# Patient Record
Sex: FEMALE | Race: WHITE | NOT HISPANIC OR LATINO | Employment: FULL TIME | URBAN - METROPOLITAN AREA
[De-identification: names, ages, dates, MRNs, and addresses within clinical notes are randomized per-mention and may not be internally consistent; named-entity substitution may affect disease eponyms.]

---

## 2018-08-03 ENCOUNTER — APPOINTMENT (OUTPATIENT)
Dept: WOMENS IMAGING | Facility: HOSPITAL | Age: 43
End: 2018-08-03

## 2018-08-03 ENCOUNTER — OFFICE VISIT (OUTPATIENT)
Dept: INTERNAL MEDICINE | Facility: CLINIC | Age: 43
End: 2018-08-03

## 2018-08-03 VITALS
WEIGHT: 148 LBS | OXYGEN SATURATION: 98 % | DIASTOLIC BLOOD PRESSURE: 64 MMHG | BODY MASS INDEX: 26.22 KG/M2 | HEIGHT: 63 IN | SYSTOLIC BLOOD PRESSURE: 94 MMHG | HEART RATE: 80 BPM

## 2018-08-03 DIAGNOSIS — M25.512 ACUTE PAIN OF LEFT SHOULDER: Primary | ICD-10-CM

## 2018-08-03 PROCEDURE — 73030 X-RAY EXAM OF SHOULDER: CPT | Performed by: NURSE PRACTITIONER

## 2018-08-03 PROCEDURE — 99213 OFFICE O/P EST LOW 20 MIN: CPT | Performed by: NURSE PRACTITIONER

## 2018-08-03 PROCEDURE — 73030 X-RAY EXAM OF SHOULDER: CPT | Performed by: RADIOLOGY

## 2018-08-03 RX ORDER — MELOXICAM 15 MG/1
15 TABLET ORAL DAILY
Qty: 30 TABLET | Refills: 0 | Status: SHIPPED | OUTPATIENT
Start: 2018-08-03 | End: 2018-09-04 | Stop reason: SDUPTHER

## 2018-08-03 RX ORDER — TRAZODONE HYDROCHLORIDE 50 MG/1
50 TABLET ORAL NIGHTLY
COMMUNITY
End: 2022-09-02

## 2018-08-03 RX ORDER — ZOLPIDEM TARTRATE 10 MG/1
TABLET ORAL NIGHTLY PRN
COMMUNITY
End: 2021-10-07

## 2018-08-03 NOTE — PROGRESS NOTES
Subjective   Romana Beavers is a 43 y.o. female who presents due to left shoulder pain.    She c/o increased pain in her left shoulder with decreased range of motion over the past month, denies recent injury or trauma. She has seen her chiro and received massage therapy with minimal improvement.       Shoulder Injury    The left shoulder is affected. There was no injury mechanism. The quality of the pain is described as aching. The pain radiates to the left neck. The pain is at a severity of 8/10. The pain is severe. Pertinent negatives include no chest pain, muscle weakness, numbness or tingling. The symptoms are aggravated by movement, overhead lifting and palpation. She has tried NSAIDs for the symptoms. The treatment provided mild relief.        The following portions of the patient's history were reviewed and updated as appropriate: allergies, current medications, past social history and problem list.    Past Medical History:   Diagnosis Date   • Abdominal pain    • Acute pharyngitis    • Acute sinusitis    • Bronchitis    • Candidiasis of mouth    • Chronic rhinitis    • Cough    • Dermatitis    • Dysuria    • GERD (gastroesophageal reflux disease)    • Neuritis    • Pain of right upper arm    • Sprain, bicep          Current Outpatient Prescriptions:   •  lamoTRIgine (LaMICtal) 100 MG tablet, Take 100 mg by mouth Daily., Disp: , Rfl:   •  Omeprazole-Sodium Bicarbonate (ZEGERID)  MG capsule, Take  by mouth., Disp: , Rfl:   •  traZODone (DESYREL) 50 MG tablet, Take 50 mg by mouth Every Night., Disp: , Rfl:   •  zolpidem (AMBIEN) 10 MG tablet, Take  by mouth At Night As Needed for Sleep., Disp: , Rfl:   •  meloxicam (MOBIC) 15 MG tablet, Take 1 tablet by mouth Daily. Take with food, Disp: 30 tablet, Rfl: 0    Allergies   Allergen Reactions   • Latex        Review of Systems   Constitutional: Negative for chills, fatigue, fever and unexpected weight change.   HENT: Negative for congestion, ear pain,  "postnasal drip, sinus pressure, sore throat and trouble swallowing.    Eyes: Negative for visual disturbance.   Respiratory: Negative for cough, chest tightness and wheezing.    Cardiovascular: Negative for chest pain, palpitations and leg swelling.   Gastrointestinal: Negative for abdominal pain.   Genitourinary: Negative for dysuria, frequency and urgency.   Musculoskeletal: Positive for arthralgias, neck pain and neck stiffness. Negative for joint swelling.   Skin: Negative for color change.   Neurological: Negative for tingling, syncope, weakness, numbness and headaches.   Hematological: Does not bruise/bleed easily.       Objective   Vitals:    08/03/18 1538   BP: 94/64   Pulse: 80   SpO2: 98%   Weight: 67.1 kg (148 lb)   Height: 160 cm (63\")     Physical Exam   Constitutional: She appears well-developed and well-nourished. She is cooperative. She does not have a sickly appearance. She does not appear ill.   HENT:   Head: Normocephalic.   Right Ear: Hearing, tympanic membrane and external ear normal.   Left Ear: Hearing, tympanic membrane and external ear normal.   Nose: Nose normal. No mucosal edema, rhinorrhea, sinus tenderness or nasal deformity. Right sinus exhibits no maxillary sinus tenderness and no frontal sinus tenderness. Left sinus exhibits no maxillary sinus tenderness and no frontal sinus tenderness.   Mouth/Throat: Oropharynx is clear and moist and mucous membranes are normal. Normal dentition.   Eyes: Pupils are equal, round, and reactive to light. Conjunctivae and lids are normal. Right eye exhibits no discharge and no exudate. Left eye exhibits no discharge and no exudate.   Neck: Trachea normal. Muscular tenderness (left paraspinal muscle tightness) present. Carotid bruit is not present. No edema and normal range of motion present. No thyroid mass and no thyromegaly present.   Cardiovascular: Regular rhythm, normal heart sounds and normal pulses.    No murmur heard.  Pulmonary/Chest: Breath " sounds normal. No respiratory distress. She has no decreased breath sounds. She has no wheezes. She has no rhonchi. She has no rales.   Musculoskeletal:        Left shoulder: She exhibits decreased range of motion and tenderness. She exhibits no swelling.   Increased pain with internal and external rotation of left shoulder   Lymphadenopathy:        Head (right side): No submental, no submandibular, no tonsillar, no preauricular, no posterior auricular and no occipital adenopathy present.        Head (left side): No submental, no submandibular, no tonsillar, no preauricular, no posterior auricular and no occipital adenopathy present.   Neurological: She is alert.   Skin: Skin is warm, dry and intact. No cyanosis. Nails show no clubbing.       Assessment/Plan   Romana was seen today for shoulder pain.    Diagnoses and all orders for this visit:    Acute pain of left shoulder  -     XR Shoulder 2+ View Left  -     meloxicam (MOBIC) 15 MG tablet; Take 1 tablet by mouth Daily. Take with food  -     Ambulatory Referral to Orthopedic Surgery    No acute abnl on xray-will send for review. She will begin Meloxicam for inflammation; however, she c/o recurrent pain so will refer to ortho for further mgmt.

## 2018-08-20 ENCOUNTER — OFFICE VISIT (OUTPATIENT)
Dept: ORTHOPEDIC SURGERY | Facility: CLINIC | Age: 43
End: 2018-08-20

## 2018-08-20 VITALS — BODY MASS INDEX: 24.49 KG/M2 | HEIGHT: 65 IN | WEIGHT: 147 LBS | TEMPERATURE: 98.8 F

## 2018-08-20 DIAGNOSIS — M54.12 CERVICAL RADICULOPATHY: Primary | ICD-10-CM

## 2018-08-20 PROCEDURE — 99203 OFFICE O/P NEW LOW 30 MIN: CPT | Performed by: ORTHOPAEDIC SURGERY

## 2018-08-20 RX ORDER — METHYLPREDNISOLONE 4 MG/1
TABLET ORAL
Qty: 21 TABLET | Refills: 0 | Status: SHIPPED | OUTPATIENT
Start: 2018-08-20 | End: 2018-10-03

## 2018-08-21 NOTE — PROGRESS NOTES
"  Patient: Romana Beavers    YOB: 1975    Medical Record Number: [id number]    Chief Complaints:  Left shoulder pain    History of Present Illness:     43 y.o. female patient who presents for evaluation of her left shoulder.  She reports a 2 month history of symptoms without any discrete precipitating event or factor.  She localizes her pain mostly to the back of the shoulder in the area of the scapula.  She describes the pain as \"under the muscle\".  Pain is moderate, constant and aching.  She does report that certain movements of her neck seemed to exacerbate the pain.  Driving, two-handed texting and typing all aggravate her pain.  She does report some pain at night as well.  She has been taking meloxicam which helps somewhat.  She reports intermittent numbness in the arm extending below the elbow but not into the hand.      Allergies:   Allergies   Allergen Reactions   • Latex Rash       Home Medications:      Current Outpatient Prescriptions:   •  lamoTRIgine (LaMICtal) 100 MG tablet, Take 100 mg by mouth Daily., Disp: , Rfl:   •  meloxicam (MOBIC) 15 MG tablet, Take 1 tablet by mouth Daily. Take with food, Disp: 30 tablet, Rfl: 0  •  Omeprazole-Sodium Bicarbonate (ZEGERID)  MG capsule, Take  by mouth., Disp: , Rfl:   •  traZODone (DESYREL) 50 MG tablet, Take 50 mg by mouth Every Night., Disp: , Rfl:   •  zolpidem (AMBIEN) 10 MG tablet, Take  by mouth At Night As Needed for Sleep., Disp: , Rfl:   •  MethylPREDNISolone (MEDROL) 4 MG tablet, Take as directed on package instructions, Disp: 21 tablet, Rfl: 0    Past Medical History:   Diagnosis Date   • Abdominal pain    • Acute pharyngitis    • Acute sinusitis    • Bronchitis    • Candidiasis of mouth    • Chronic rhinitis    • Cough    • Dermatitis    • Dysuria    • GERD (gastroesophageal reflux disease)    • Neuritis    • Pain of right upper arm    • Sprain, bicep        History reviewed. No pertinent surgical history.    Social History " "    Occupational History   • Not on file.     Social History Main Topics   • Smoking status: Never Smoker   • Smokeless tobacco: Not on file   • Alcohol use Yes   • Drug use: No   • Sexual activity: Not on file      Social History     Social History Narrative   • No narrative on file       History reviewed. No pertinent family history.    Review of Systems:      Constitutional: Denies fever, shaking or chills   Eyes: Denies change in visual acuity   HEENT: Denies nasal congestion or sore throat   Respiratory: Denies cough or shortness of breath   Cardiovascular: Denies chest pain or edema  Endocrine: Denies tremors, palpitations, intolerance of heat or cold, polyuria, polydipsia.  GI: Denies abdominal pain, nausea, vomiting, bloody stools or diarrhea  : Denies frequency, urgency, incontinence, retention, or nocturia.  Musculoskeletal: Denies numbness, tingling or loss of motor function except as above  Integument: Denies rash, lesion or ulceration   Neurologic: Denies headache or focal weakness, deficits  Heme: Denies spontaneous or excessive bleeding, epistaxis, hematuria, melena, fatigue, enlarged or tender lymph nodes.      All other pertinent positives and negatives as noted above in HPI.    Physical Exam: 43 y.o. female    Vitals:    08/20/18 1402   Temp: 98.8 °F (37.1 °C)   TempSrc: Temporal Artery    Weight: 66.7 kg (147 lb)   Height: 165.1 cm (65\")     General:  Patient is awake and alert.  Appears in no acute distress or discomfort.    Psych:  Affect and demeanor are appropriate.    Eyes:  Conjunctiva and sclera appear grossly normal.  Eyes track well and EOM seem to be intact.    Ears:  No gross abnormalities.  Hearing adequate for the exam.    Cardiovascular:  Regular rate and rhythm.    Lungs:  Good chest expansion.  Breathing unlabored.    Lymph:  No palpable masses or adenopathy in the affected extremity    Neck: Skin is benign.  No tenderness or step-offs.  Good motion.  Spurling's to the left " reproduces shooting pain into the back of her scapula.    Extremities:  Left shoulder:  Skin is benign.  No gross abnormalities on inspection.  No palpable masses or adenopathy.  No effusion.  Mild tenderness adjacent to the superior border of the scapula.  No crepitus.  No winging.  Full symmetric motion.  No instability.  Negative Neer, Oseguera, Speed's, Yergason's, active compression and O'Briens maneuvers.  Good strength with forward flexion, resisted internal and external rotation, and resisted abduction.  Sensation is intact.  Brisk capillary refill in the fingers with good skin turgor.         Radiology:   Outside AP and orthogonal views of the left shoulder are reviewed.  No comparison films are available.  No acute abnormalities, lesions, masses, significant degenerative changes or other concerning findings.    Assessment/Plan:  Cervical radiculopathy    I think this is probably coming from her neck.  She does have a little bit of tenderness in the scapula but the Spurling seems to most closely reproduce her typical pain.  I suggested that we start with a conservative approach.  I gave her a prescription for Medrol Dosepak.  I have referred her to therapy.  I recommended that she give this roughly 1 month.  If no better, I want to see her back and reevaluate.  If anything changes, I told her to notify me.    Hola Shell MD    08/20/2018    CC to Alfreda Wilson MD

## 2018-08-24 ENCOUNTER — HOSPITAL ENCOUNTER (OUTPATIENT)
Dept: PHYSICAL THERAPY | Facility: HOSPITAL | Age: 43
Setting detail: THERAPIES SERIES
Discharge: HOME | End: 2018-08-24
Attending: ORTHOPAEDIC SURGERY

## 2018-08-24 DIAGNOSIS — M54.6 LEFT-SIDED THORACIC BACK PAIN, UNSPECIFIED CHRONICITY: Primary | ICD-10-CM

## 2018-08-24 DIAGNOSIS — M54.2 NECK PAIN: ICD-10-CM

## 2018-08-24 PROCEDURE — 97140 MANUAL THERAPY 1/> REGIONS: CPT | Performed by: PHYSICAL THERAPIST

## 2018-08-24 PROCEDURE — 97161 PT EVAL LOW COMPLEX 20 MIN: CPT | Performed by: PHYSICAL THERAPIST

## 2018-08-24 NOTE — THERAPY EVALUATION
Outpatient Physical Therapy Ortho Initial Evaluation  [facility]     Patient Name: Romana Beavers  : 1975  MRN: [id number]  Today's Date: 2018      Visit Date: 2018    There is no problem list on file for this patient.       Past Medical History:   Diagnosis Date   • Abdominal pain    • Acute pharyngitis    • Acute sinusitis    • Bronchitis    • Candidiasis of mouth    • Chronic rhinitis    • Cough    • Dermatitis    • Dysuria    • GERD (gastroesophageal reflux disease)    • Neuritis    • Pain of right upper arm    • Sprain, bicep         No past surgical history on file.    Visit Dx:     ICD-10-CM ICD-9-CM   1. Left-sided thoracic back pain, unspecified chronicity M54.6 724.1   2. Neck pain M54.2 723.1             Patient History     Row Name 18 1400             History    Chief Complaint Difficulty with daily activities;Pain;Numbness;Tightness;Muscle tenderness  -      Date Current Problem(s) Began 18  -      Brief Description of Current Complaint Pt started with L scapular pain about 2 months ago. She is R handed. She does have intermittent n&t iin the L arm to the hand, fingers (thumb, index, 3rd finger)  -      Previous treatment for THIS PROBLEM Medication;Massage  -      Patient/Caregiver Goals Relieve pain;Return to prior level of function;Improve mobility;Improve strength  -      Patient's Rating of General Health Very good  -      Hand Dominance right-handed  -      Occupation/sports/leisure activities computer work  -      How has patient tried to help current problem? steroid dose pack, was taking meloxicam, massage  -      What clinical tests have you had for this problem? X-ray   shoulder  -         Pain     Pain Location Neck;Shoulder  -      Pain at Present 3  -LH      Pain at Worst 6  -LH      Pain Frequency Constant/continuous  -      Pain Description Aching;Sharp;Tingling;Numbness  -      What Performance Factors Make the Current  Problem(s) WORSE? driving, computer work, sleeping,   -      What Performance Factors Make the Current Problem(s) BETTER? heat, rest  -      Is your sleep disturbed? Yes   wakes her up about 4x a night  -LH      Is medication used to assist with sleep? Yes  -LH      What position do you sleep in? Right sidelying  -         Fall Risk Assessment    Any falls in the past year: No  -LH         Services    Prior Rehab/Home Health Experiences No  -         Daily Activities    Primary Language English  -      How does patient learn best? Listening  -      Teaching needs identified Home Exercise Program;Management of Condition  -      Patient is concerned about/has problems with Performing home management (household chores, shopping, care of dependents);Performing job responsibilities/community activities (work, school,;Repetitive movements of the hand, arm, shoulder  -LH      Does patient have problems with the following? None  -      Barriers to learning None  -      Pt Participated in POC and Goals Yes  -         Safety    Are you being hurt, hit, or frightened by anyone at home or in your life? No  -LH      Are you being neglected by a caregiver No  -        User Key  (r) = Recorded By, (t) = Taken By, (c) = Cosigned By    Initials Name Provider Type     Malaika Benson PT Physical Therapist                PT Ortho     Row Name 08/24/18 1500       Posture/Observations    Posture/Observations Comments rounded posture with forward head when seated  -       Myotomal Screen- Upper Quarter Clearing    Shoulder flexion (C5) 4+ (Good +)  -LH    Elbow flexion/wrist extension (C6) 4+ (Good +)  -LH    Elbow extension/wrist flexion (C7) 4+ (Good +)  -    Finger flexion/ (C8) 4+ (Good +)  -       Cervical Palpation    Upper Traps Left:;Guarded/taut  -    Middle Traps Left:;Tender;Guarded/taut;Trigger point  -    Rhomboids Left:;Tender;Guarded/taut;Trigger point  -       Cervical Accessory  Motions    Cervical Accessory Motions Tested? --   no increased pain or limitations  -       Thoracic Accessory Motions    Pa glide- Upper thoracic Hypomobile  -    Pa glide- Middle thoracic Hypomobile  -       Cervical/Thoracic Special Tests    Spurlings (Foraminal Compression) Negative   pain on L midback with R sided testing, muscular  -    Cervical Compression (Forarminal Compression vs. Facet Pain) Negative  -    Cervical Distraction (Foraminal Compression vs. Facet Pain) Negative  -       Lumbosacral Palpation    Erector Spinae (Paraspinals) Left:;Tender;Guarded/taut;Trigger point   about T4-7  -       General ROM    GENERAL ROM COMMENTS no increased pain with shoulder ROM  -       Head/Neck/Trunk    Head/Neck/Trunk Comments cervical AROM limited 10-25% with pain on the L scapular area with flexion, R lateral flexion, and rotation  -       Sensation    Sensation WNL? WNL  -       Upper Extremity Flexibility    Pect Minor Bilateral:;Mildly limited  -    Pect Major Bilateral:;Mildly limited  -      User Key  (r) = Recorded By, (t) = Taken By, (c) = Cosigned By    Initials Name Provider Type     Malaika Benson, PT Physical Therapist                      Therapy Education  Given: HEP, Symptoms/condition management, Pain management, Posture/body mechanics  Program: New  How Provided: Verbal, Demonstration, Written  Provided to: Patient  Level of Understanding: Teach back education performed, Verbalized, Demonstrated           PT OP Goals     Row Name 08/24/18 1500          PT Short Term Goals    STG 1 Patient will be independent with education for symptom management, joint protection and strategies to minimize stress on affected tissues  -     STG 1 Progress New  -     STG 2 Pt to improve pain complaints to no more than 4/10  -     STG 2 Progress [facility]        Long Term Goals    LTG 1 Pt to improve pain complaints to no more than 2/10  -     LTG 1 Progress [facility]     LTG 2  Pt to improve NDI score from 40% to 20% for overall functional improvement  -     LTG 2 Progress New  -     LTG 3 Patient will achieve 5 hours of uninterrupted sleep without increases in pain.  -     LTG 3 Progress New  -     LTG 4 Patient will be independent with progressive home exercise program for improved functional mobility and stability of the spine.  -     LTG 4 Progress New  -        Time Calculation    PT Goal Re-Cert Due Date 11/24/18  -       User Key  (r) = Recorded By, (t) = Taken By, (c) = Cosigned By    Initials Name Provider Type     Malaika Benson, PT Physical Therapist                PT Assessment/Plan     Row Name 08/24/18 1532          PT Assessment    Functional Limitations Limitations in community activities;Limitation in home management;Performance in self-care ADL;Performance in leisure activities;Limitations in functional capacity and performance  -     Impairments Impaired flexibility;Muscle strength;Pain;Joint mobility;Posture;Poor body mechanics;Range of motion  -     Assessment Comments Pt presents to therapy with complaints of L sided mid back pain that started of insidious onset 2 months ago. She started a steroid dose pack and it is reducing her pain. She has mild ROM deficits of her cervical spine, with pain aournd the L scapular with flexion, R lateral flexion and rotation. She has no pain or limitations with her shoulder ROM, no reproductoin of symptoms with cervical PROM, compression, distraction, or Spurlings tests, and decreased moiblity of the thoracic spine. She had trigger poinnts in the L iliocostalis, rhomboids, and mid trap muscles. Pt has N&T into the L thumb-3rd fingers that is intermittent, but not reproducable today. Pt would benefit from therapy to help address her pain with driving, computer work, and while sleeping.   -     Please refer to paper survey for additional self-reported information Yes  -     Rehab Potential Excellent  -      Patient/caregiver participated in establishment of treatment plan and goals Yes  -     Patient would benefit from skilled therapy intervention Yes  -        PT Plan    PT Frequency 1x/week  -     Predicted Duration of Therapy Intervention (Therapy Eval) 4-6 weeks  -     Planned CPT's? PT EVAL LOW COMPLEXITY: 58092;PT ULTRASOUND EA 15 MIN: 50689;PT MANUAL THERAPY EA 15 MIN: 80744;PT THER PROC EA 15 MIN: 12254;PT HOT/COLD PACK WC NONMCARE: 18408;PT ELECTRICAL STIM UNATTEND:   -     Physical Therapy Interventions (Optional Details) joint mobilization;patient/family education;postural re-education;manual therapy techniques;ROM (Range of Motion);modalities;strengthening;stretching;dry needling;home exercise program  -     PT Plan Comments plan to see pt weekly for spinal stability and mobility, posture educaiton, and pain reduction  -       User Key  (r) = Recorded By, (t) = Taken By, (c) = Cosigned By    Initials Name Provider Type     Malaika Benson, PT Physical Therapist                  Exercises     Row Name 08/24/18 1500             Total Minutes    16143 - PT Therapeutic Exercise Minutes 6  -LH      46762 - PT Manual Therapy Minutes 12  -LH         Exercise 1    Exercise Name 1 shoulder roll, scap ret  -      Reps 1 10  -LH         Exercise 2    Exercise Name 2 scap ret and shoulder ext  -      Sets 2 1  -      Reps 2 10  -LH      Additional Comments orange  -LH         Exercise 3    Exercise Name 3 cat/camel  -      Sets 3 1  -      Reps 3 10  -LH         Exercise 4    Exercise Name 4 prayer stretch  -      Sets 4 1  -      Reps 4 3  -LH      Time 4 20 sec  -LH         Exercise 5    Exercise Name 5 pect stretch in doorway  -      Sets 5 1  -      Reps 5 3  -LH      Time 5 20 sec  -LH        User Key  (r) = Recorded By, (t) = Taken By, (c) = Cosigned By    Initials Name Provider Type     Malaika Benson, PT Physical Therapist           Manual Rx (last 36 hours)       Manual Treatments     Row Name 08/24/18 1500             Total Minutes    62985 - PT Manual Therapy Minutes 12  -LH         Manual Rx 1    Manual Rx 1 Location L iliocostalis, mid trap, rhomboid  -      Manual Rx 1 Type intramuscular manual therapy using direct techniques. LTRs were achieved. Palpation was done before, during and after tx. Precautions were taken over the lung field. Obtained written and verbal consent to treat  -      Manual Rx 1 Duration 5  -LH         Manual Rx 2    Manual Rx 2 Location L midthoracic PVMs  -      Manual Rx 2 Type STM to above following IMT  -      Manual Rx 2 Duration 7  -LH        User Key  (r) = Recorded By, (t) = Taken By, (c) = Cosigned By    Initials Name Provider Type     Malaika Benson, PT Physical Therapist                      Outcome Measure Options: Neck Disability Index (NDI) (40%)         Time Calculation:     Therapy Suggested Charges     Code   Minutes Charges    39025 (CPT®) Hc Pt Neuromusc Re Education Ea 15 Min      26977 (CPT®) Hc Pt Ther Proc Ea 15 Min 6     02492 (CPT®) Hc Gait Training Ea 15 Min      46817 (CPT®) Hc Pt Therapeutic Act Ea 15 Min      59176 (CPT®) Hc Pt Manual Therapy Ea 15 Min 12 1    34780 (CPT®) Hc Pt Ther Massage- Per 15 Min      81779 (CPT®) Hc Pt Iontophoresis Ea 15 Min      31049 (CPT®) Hc Pt Elec Stim Ea-Per 15 Min      60077 (CPT®) Hc Pt Ultrasound Ea 15 Min      63600 (CPT®) Hc Pt Self Care/Mgmt/Train Ea 15 Min      26359 (CPT®) Hc Pt Prosthetic (S) Train Initial Encounter, Each 15 Min      75301 (CPT®) Hc Orthotic(S) Mgmt/Train Initial Encounter, Each 15min      36680 (CPT®) Hc Pt Aquatic Therapy Ea 15 Min      34654 (CPT®) Hc Pt Orthotic(S)/Prosthetic(S) Encounter, Each 15 Min      Total  18 1          Start Time: 1415  Stop Time: 1505  Time Calculation (min): 50 min  Total Timed Code Minutes- PT: 45 minute(s)     Therapy Charges for Today     Code Description Service Date Service Provider Modifiers Qty    83614950600  HC PT MANUAL THERAPY EA 15 MIN 8/24/2018 Malaika Benson, PT GP 1    80951070502 HC PT EVAL LOW COMPLEXITY 2 8/24/2018 Malaika Benson, PT GP 1          PT G-Codes  Outcome Measure Options: Neck Disability Index (NDI) (40%)         Malaika Benson, PT  8/24/2018

## 2018-08-29 ENCOUNTER — HOSPITAL ENCOUNTER (OUTPATIENT)
Dept: PHYSICAL THERAPY | Facility: HOSPITAL | Age: 43
Setting detail: THERAPIES SERIES
Discharge: HOME | End: 2018-08-29
Attending: ORTHOPAEDIC SURGERY

## 2018-08-29 DIAGNOSIS — M54.2 NECK PAIN: ICD-10-CM

## 2018-08-29 DIAGNOSIS — M54.6 LEFT-SIDED THORACIC BACK PAIN, UNSPECIFIED CHRONICITY: Primary | ICD-10-CM

## 2018-08-29 PROCEDURE — 97140 MANUAL THERAPY 1/> REGIONS: CPT | Performed by: PHYSICAL THERAPIST

## 2018-08-29 PROCEDURE — 97110 THERAPEUTIC EXERCISES: CPT | Performed by: PHYSICAL THERAPIST

## 2018-08-29 NOTE — THERAPY TREATMENT NOTE
Outpatient Physical Therapy Ortho Treatment Note  [facility]     Patient Name: Romana Beavers  : 1975  MRN: [id number]  Today's Date: 2018      Visit Date: 2018    Visit Dx:    ICD-10-CM ICD-9-CM   1. Left-sided thoracic back pain, unspecified chronicity M54.6 724.1   2. Neck pain M54.2 723.1       There is no problem list on file for this patient.       Past Medical History:   Diagnosis Date   • Abdominal pain    • Acute pharyngitis    • Acute sinusitis    • Bronchitis    • Candidiasis of mouth    • Chronic rhinitis    • Cough    • Dermatitis    • Dysuria    • GERD (gastroesophageal reflux disease)    • Neuritis    • Pain of right upper arm    • Sprain, bicep         No past surgical history on file.          PT Ortho     Row Name 18 08       Subjective Pain    Post-Treatment Pain Level 2  -      User Key  (r) = Recorded By, (t) = Taken By, (c) = Cosigned By    Initials Name Provider Type    Malaika Garcia, PT Physical Therapist                            PT Assessment/Plan     Row Name 18 0857          PT Assessment    Assessment Comments Pt did well with initial visit and only had soreness for about a day. The area in which we worked on last time feels better, but she has a little more pain out nearer the insertion along the scapula of the rhomboids and mid trap. She tolerated the treatment well today with minimal soreness.   -        PT Plan    PT Plan Comments cont  -       User Key  (r) = Recorded By, (t) = Taken By, (c) = Cosigned By    Initials Name Provider Type     Malaika Benson, PT Physical Therapist                    Exercises     Row Name 18 0818 0700          Subjective Comments    Subjective Comments I was pretty sore the dayof the needling, but it improved after that  -  --        Subjective Pain    Able to rate subjective pain? yes  -  --     Pre-Treatment Pain Level 3  -  --     Post-Treatment Pain Level 2  -  --         Total Minutes    00783 - PT Therapeutic Exercise Minutes 20  -LH  --     81124 - PT Manual Therapy Minutes  -- 25  -LH        Exercise 1    Exercise Name 1 shoulder roll, scap ret  -LH  --     Reps 1 10  -LH  --        Exercise 2    Exercise Name 2 scap ret and shoulder ext  -LH  --     Sets 2 2  -LH  --     Reps 2 10  -LH  --     Additional Comments orange  -LH  --        Exercise 3    Exercise Name 3 cat/camel  -LH  --     Sets 3 1  -LH  --     Reps 3 15  -LH  --        Exercise 4    Exercise Name 4 prayer stretch  -LH  --     Sets 4 1  -LH  --     Reps 4 3  -LH  --     Time 4 20 sec  -LH  --        Exercise 5    Exercise Name 5 pect stretch in doorway  -LH  --     Sets 5 1  -LH  --     Reps 5 3  -LH  --     Time 5 20 sec  -LH  --        Exercise 6    Exercise Name 6 supine horiz abd and D2 flexion  -LH  --     Sets 6 1  -LH  --     Reps 6 20  -LH  --     Additional Comments orange  -LH  --       User Key  (r) = Recorded By, (t) = Taken By, (c) = Cosigned By    Initials Name Provider Type     Malaika Benson, PT Physical Therapist                        Manual Rx (last 36 hours)      Manual Treatments     Row Name 08/29/18 0700             Total Minutes    61181 - PT Manual Therapy Minutes 25  -LH         Manual Rx 1    Manual Rx 1 Location L iliocostalis, mid trap, rhomboid, and serratus post sup  -LH      Manual Rx 1 Type intramuscular manual therapy using direct techniques. LTRs were achieved. Palpation was done before, during and after tx. Precautions were taken over the lung field. Obtained verbal consent to treat  -      Manual Rx 1 Duration 10  -LH         Manual Rx 2    Manual Rx 2 Location L midthoracic PVMs  -      Manual Rx 2 Type STM to above following IMT with PA mobs to the ribs for improved mobility  -      Manual Rx 2 Grade grade 2-3  -      Manual Rx 2 Duration 15  -LH        User Key  (r) = Recorded By, (t) = Taken By, (c) = Cosigned By    Initials Name Provider Type      Malaika Benson, PT Physical Therapist              Therapy Education  Given: HEP, Symptoms/condition management, Pain management, Posture/body mechanics  Program: New  How Provided: Verbal, Written  Provided to: Patient  Level of Understanding: Teach back education performed              Time Calculation:   Start Time: 0800  Stop Time: 0845  Time Calculation (min): 45 min  Therapy Suggested Charges     Code   Minutes Charges    52363 (CPT®) Hc Pt Neuromusc Re Education Ea 15 Min      69468 (CPT®) Hc Pt Ther Proc Ea 15 Min 20 1    92816 (CPT®) Hc Gait Training Ea 15 Min      15064 (CPT®) Hc Pt Therapeutic Act Ea 15 Min      83371 (CPT®) Hc Pt Manual Therapy Ea 15 Min 25 2    72715 (CPT®) Hc Pt Ther Massage- Per 15 Min      53330 (CPT®) Hc Pt Iontophoresis Ea 15 Min      18508 (CPT®) Hc Pt Elec Stim Ea-Per 15 Min      30591 (CPT®) Hc Pt Ultrasound Ea 15 Min      70591 (CPT®) Hc Pt Self Care/Mgmt/Train Ea 15 Min      79287 (CPT®) Hc Pt Prosthetic (S) Train Initial Encounter, Each 15 Min      78155 (CPT®) Hc Orthotic(S) Mgmt/Train Initial Encounter, Each 15min      41115 (CPT®) Hc Pt Aquatic Therapy Ea 15 Min      24308 (CPT®) Hc Pt Orthotic(S)/Prosthetic(S) Encounter, Each 15 Min      Total  45 3        Therapy Charges for Today     Code Description Service Date Service Provider Modifiers Qty    56873667585 HC PT THER PROC EA 15 MIN 8/29/2018 Malaika Benson, PT GP 1    34313567927 HC PT MANUAL THERAPY EA 15 MIN 8/29/2018 Malaika Benson, PT GP 2                    Malaika Benson, PT  8/29/2018

## 2018-09-04 DIAGNOSIS — M25.512 ACUTE PAIN OF LEFT SHOULDER: ICD-10-CM

## 2018-09-04 RX ORDER — MELOXICAM 15 MG/1
15 TABLET ORAL DAILY
Qty: 30 TABLET | Refills: 2 | Status: SHIPPED | OUTPATIENT
Start: 2018-09-04 | End: 2018-12-27

## 2018-09-04 NOTE — TELEPHONE ENCOUNTER
From: Romana Beavers  Sent: 9/4/2018 8:59 AM EDT  Subject: Medication Renewal Request    Romana Ruthann would like a refill of the following medications:     meloxicam (MOBIC) 15 MG tablet [Dipika Garnica, APRN]   Patient Comment: My shoulder is very sore and I still have several weeks of therapy left.     Preferred pharmacy: [address] & ([facility] [phone]  - [phone] FX  Delivery method: Pickup  Preferred pick-up date and time: 9/5/2018

## 2018-09-05 ENCOUNTER — HOSPITAL ENCOUNTER (OUTPATIENT)
Dept: PHYSICAL THERAPY | Facility: HOSPITAL | Age: 43
Setting detail: THERAPIES SERIES
Discharge: HOME | End: 2018-09-05
Attending: ORTHOPAEDIC SURGERY

## 2018-09-05 DIAGNOSIS — M54.6 LEFT-SIDED THORACIC BACK PAIN, UNSPECIFIED CHRONICITY: Primary | ICD-10-CM

## 2018-09-05 DIAGNOSIS — M54.2 NECK PAIN: ICD-10-CM

## 2018-09-05 PROCEDURE — 97140 MANUAL THERAPY 1/> REGIONS: CPT | Performed by: PHYSICAL THERAPIST

## 2018-09-05 PROCEDURE — 97035 APP MDLTY 1+ULTRASOUND EA 15: CPT | Performed by: PHYSICAL THERAPIST

## 2018-09-05 NOTE — THERAPY TREATMENT NOTE
Outpatient Physical Therapy Ortho Treatment Note  [facility]     Patient Name: Romana Beavers  : 1975  MRN: [id number]  Today's Date: 2018      Visit Date: 2018    Visit Dx:    ICD-10-CM ICD-9-CM   1. Left-sided thoracic back pain, unspecified chronicity M54.6 724.1   2. Neck pain M54.2 723.1       There is no problem list on file for this patient.       Past Medical History:   Diagnosis Date   • Abdominal pain    • Acute pharyngitis    • Acute sinusitis    • Bronchitis    • Candidiasis of mouth    • Chronic rhinitis    • Cough    • Dermatitis    • Dysuria    • GERD (gastroesophageal reflux disease)    • Neuritis    • Pain of right upper arm    • Sprain, bicep         No past surgical history on file.                          PT Assessment/Plan     Row Name 18 1112          PT Assessment    Assessment Comments Pt with pain levels improving overall but by the end of the day her pain is still about 6-7/10. Her trigger points are more in the upper trap, levator, and along her scapular border. She does respond well to dry needling to improve tone and reduce pain leves  -        PT Plan    PT Plan Comments cont  -LH       User Key  (r) = Recorded By, (t) = Taken By, (c) = Cosigned By    Initials Name Provider Type     Malaika Benson, PT Physical Therapist                Modalities     Row Name 18 1100             Ultrasound 16884    Location L UT, levator, and mid trap  -      Duty Cycle 100  -      Frequency 1.0 MHz  -      Intensity - Wts/cm 1.4  -      66933 - PT Ultrasound Minutes 9  -LH        User Key  (r) = Recorded By, (t) = Taken By, (c) = Cosigned By    Initials Name Provider Type     Malaika Benson, PT Physical Therapist                Exercises     Row Name 18 1100 18 1000          Subjective Comments    Subjective Comments  -- The original spot of pain has improved but it has moved up a little more on the shoulder blade. She got a new Rx  for meloxicam  -        Subjective Pain    Able to rate subjective pain?  -- yes  -     Pre-Treatment Pain Level  -- 3  -     Subjective Pain Comment  -- by the end of day about 6-7/10  -        Total Minutes    08477 - PT Manual Therapy Minutes 30  -LH  --       User Key  (r) = Recorded By, (t) = Taken By, (c) = Cosigned By    Initials Name Provider Type     Malaika Benson PT Physical Therapist                        Manual Rx (last 36 hours)      Manual Treatments     Row Name 09/05/18 1100             Total Minutes    78817 - PT Manual Therapy Minutes 30  -LH         Manual Rx 1    Manual Rx 1 Location B UT, L levator, mid trap, rhomboid, and serratus post sup  -      Manual Rx 1 Type intramuscular manual therapy using direct techniques. LTRs were achieved. Palpation was done before, during and after tx. Precautions were taken over the lung field. Obtained verbal consent to treat  -      Manual Rx 1 Duration 12  -LH         Manual Rx 3    Manual Rx 3 Location B UT, levator  -      Manual Rx 3 Type STM following IMT with manual stretching to L UT  -      Manual Rx 3 Duration 18  -LH        User Key  (r) = Recorded By, (t) = Taken By, (c) = Cosigned By    Initials Name Provider Type     Malaika Benson PT Physical Therapist              Therapy Education  Given: Symptoms/condition management, Pain management, Posture/body mechanics  Program: Reinforced  How Provided: Verbal  Provided to: Patient  Level of Understanding: Teach back education performed              Time Calculation:   Start Time: 1020  Stop Time: 1105  Time Calculation (min): 45 min  Total Timed Code Minutes- PT: 40 minute(s)  Therapy Suggested Charges     Code   Minutes Charges    71197 (CPT®) Hc Pt Neuromusc Re Education Ea 15 Min      58759 (CPT®) Hc Pt Ther Proc Ea 15 Min      81016 (CPT®) Hc Gait Training Ea 15 Min      51501 (CPT®) Hc Pt Therapeutic Act Ea 15 Min      24735 (CPT®) Hc Pt Manual Therapy Ea 15 Min 30 2     49536 (CPT®) Hc Pt Ther Massage- Per 15 Min      23705 (CPT®) Hc Pt Iontophoresis Ea 15 Min      27149 (CPT®) Hc Pt Elec Stim Ea-Per 15 Min      99292 (CPT®) Hc Pt Ultrasound Ea 15 Min 9 1    60901 (CPT®) Hc Pt Self Care/Mgmt/Train Ea 15 Min      01746 (CPT®) Hc Pt Prosthetic (S) Train Initial Encounter, Each 15 Min      09706 (CPT®) Hc Orthotic(S) Mgmt/Train Initial Encounter, Each 15min      81273 (CPT®) Hc Pt Aquatic Therapy Ea 15 Min      12915 (CPT®) Hc Pt Orthotic(S)/Prosthetic(S) Encounter, Each 15 Min      Total  39 3        Therapy Charges for Today     Code Description Service Date Service Provider Modifiers Qty    38644328530 HC PT MANUAL THERAPY EA 15 MIN 9/5/2018 Malaika Benson, PT GP 2    15139375389 HC PT ULTRASOUND EA 15 MIN 9/5/2018 Malaika Benson, PT GP 1                    Malaika Benson, PT  9/5/2018

## 2018-09-21 ENCOUNTER — HOSPITAL ENCOUNTER (OUTPATIENT)
Dept: PHYSICAL THERAPY | Facility: HOSPITAL | Age: 43
Setting detail: THERAPIES SERIES
Discharge: HOME | End: 2018-09-21
Attending: ORTHOPAEDIC SURGERY

## 2018-09-21 DIAGNOSIS — M54.6 LEFT-SIDED THORACIC BACK PAIN, UNSPECIFIED CHRONICITY: Primary | ICD-10-CM

## 2018-09-21 DIAGNOSIS — M54.2 NECK PAIN: ICD-10-CM

## 2018-09-21 PROCEDURE — 97140 MANUAL THERAPY 1/> REGIONS: CPT | Performed by: PHYSICAL THERAPIST

## 2018-09-21 NOTE — THERAPY PROGRESS REPORT/RE-CERT
Outpatient Physical Therapy Ortho Progress Note  [facility]     Patient Name: Romana Beavers  : 1975  MRN: [id number]  Today's Date: 2018      Visit Date: 2018    Visit Dx:    ICD-10-CM ICD-9-CM   1. Left-sided thoracic back pain, unspecified chronicity M54.6 724.1   2. Neck pain M54.2 723.1       There is no problem list on file for this patient.       Past Medical History:   Diagnosis Date   • Abdominal pain    • Acute pharyngitis    • Acute sinusitis    • Bronchitis    • Candidiasis of mouth    • Chronic rhinitis    • Cough    • Dermatitis    • Dysuria    • GERD (gastroesophageal reflux disease)    • Neuritis    • Pain of right upper arm    • Sprain, bicep         No past surgical history on file.          PT Ortho     Row Name 18 1300       Subjective Comments    Subjective Comments Still having the pain, she does have some pain into the elbow. Burning pain by the end of the day.   -       Subjective Pain    Able to rate subjective pain? yes  -    Pre-Treatment Pain Level 2  -    Subjective Pain Comment by the end of the day, -7/10  -       Posture/Observations    Posture/Observations Comments rounded posture with forward head when seated  -       Myotomal Screen- Upper Quarter Clearing    Shoulder flexion (C5) 4+ (Good +)  -LH    Elbow flexion/wrist extension (C6) 4+ (Good +)  -    Elbow extension/wrist flexion (C7) 4+ (Good +)  -    Finger flexion/ (C8) 4+ (Good +)  -       Cervical Palpation    Levator Scapula Left:;Tender;Guarded/taut;Trigger point  -    Upper Traps Left:;Tender;Guarded/taut;Trigger point  -    Middle Traps Left:;Tender;Guarded/taut;Trigger point  -    Rhomboids Left:;Tender;Guarded/taut;Trigger point  -       Cervical/Thoracic Special Tests    Spurlings (Foraminal Compression) Negative  -    Cervical Compression (Forarminal Compression vs. Facet Pain) Negative  -    Cervical Distraction (Foraminal Compression vs. Facet Pain)  Negative  -       Lumbosacral Palpation    Erector Spinae (Paraspinals) Left:;Tender;Guarded/taut;Trigger point  -       Head/Neck/Trunk    Head/Neck/Trunk Comments cervical AROM and pain with flexion, pt slightly tilts to the R with ext, tightness with lateral flexion  -       Sensation    Sensation WNL? WNL  -       Upper Extremity Flexibility    Pect Minor Left:;Mildly limited  -    Pect Major Left:;Mildly limited  -      User Key  (r) = Recorded By, (t) = Taken By, (c) = Cosigned By    Initials Name Provider Type     Malaika Benson, PT Physical Therapist                            PT Assessment/Plan     Row Name 09/21/18 1522          PT Assessment    Assessment Comments Pt has improved pain levels for a few days after her therapy sessions, but her pain at the end of the work day is 6-7/10 still. She is starting to have some pain in the L posterior UE to the elbow. She continues with trigger points in the L neck and scapular area.   -     Rehab Potential Good  -     Patient/caregiver participated in establishment of treatment plan and goals Yes  -     Patient would benefit from skilled therapy intervention Yes  -        PT Plan    PT Frequency 1x/week  -     PT Plan Comments cont. pt to make follow up with MD  -       User Key  (r) = Recorded By, (t) = Taken By, (c) = Cosigned By    Initials Name Provider Type     Malaika Benson, PT Physical Therapist                    Exercises     Row Name 09/21/18 1500 09/21/18 1300          Subjective Comments    Subjective Comments  -- Still having the pain, she does have some pain into the elbow. Burning pain by the end of the day.   -        Subjective Pain    Able to rate subjective pain?  -- yes  -     Pre-Treatment Pain Level  -- 2  -     Subjective Pain Comment  -- by the end of the day, 6-7/10  -        Total Minutes    32467 - PT Manual Therapy Minutes 35  -  --       User Key  (r) = Recorded By, (t) = Taken By, (c) =  Cosigned By    Initials Name Provider Type     Malaika Benson, PT Physical Therapist                        Manual Rx (last 36 hours)      Manual Treatments     Row Name 09/21/18 1500             Total Minutes    43638 - PT Manual Therapy Minutes 35  -         Manual Rx 1    Manual Rx 1 Location B UT, L levator, mid trap, rhomboid, and serratus post sup  -      Manual Rx 1 Type intramuscular manual therapy using direct techniques. LTRs were achieved. Palpation was done before, during and after tx. Precautions were taken over the lung field. Obtained verbal consent to treat  -      Manual Rx 1 Duration 12  -         Manual Rx 3    Manual Rx 3 Location B UT, levator  -      Manual Rx 3 Type STM following IMT with manual stretching to L UT  -      Manual Rx 3 Duration 18  -         Manual Rx 4    Manual Rx 4 Location re-eval of neck/shoulder  -        User Key  (r) = Recorded By, (t) = Taken By, (c) = Cosigned By    Initials Name Provider Type     Malaika Benson, PT Physical Therapist                PT OP Goals     Row Name 09/21/18 1500          PT Short Term Goals    STG 1 Patient will be independent with education for symptom management, joint protection and strategies to minimize stress on affected tissues  -     STG 1 Progress Ongoing  -     STG 2 Pt to improve pain complaints to no more than 4/10  -     STG 2 Progress Ongoing  -        Long Term Goals    LTG 1 Pt to improve pain complaints to no more than 2/10  -     LTG 1 Progress Ongoing  -     LTG 2 Pt to improve NDI score from 40% to 20% for overall functional improvement  -     LTG 2 Progress Ongoing  [facility]     LTG 3 Patient will achieve 5 hours of uninterrupted sleep without increases in pain.  -     LTG 3 Progress Ongoing  -     LTG 4 Patient will be independent with progressive home exercise program for improved functional mobility and stability of the spine.  -     LTG 4 Progress Ongoing  -       User Key   (r) = Recorded By, (t) = Taken By, (c) = Cosigned By    Initials Name Provider Type     Malaika Benson, PT Physical Therapist          Therapy Education  Given: Symptoms/condition management, Pain management, Posture/body mechanics  How Provided: Verbal  Provided to: Patient  Level of Understanding: Teach back education performed              Time Calculation:   Start Time: 1340  Stop Time: 1415  Time Calculation (min): 35 min  Total Timed Code Minutes- PT: 35 minute(s)  Therapy Suggested Charges     Code   Minutes Charges    18702 (CPT®) Hc Pt Neuromusc Re Education Ea 15 Min      61796 (CPT®) Hc Pt Ther Proc Ea 15 Min      78642 (CPT®) Hc Gait Training Ea 15 Min      00099 (CPT®) Hc Pt Therapeutic Act Ea 15 Min      36669 (CPT®) Hc Pt Manual Therapy Ea 15 Min 35 2    74966 (CPT®) Hc Pt Ther Massage- Per 15 Min      81506 (CPT®) Hc Pt Iontophoresis Ea 15 Min      79591 (CPT®) Hc Pt Elec Stim Ea-Per 15 Min      03389 (CPT®) Hc Pt Ultrasound Ea 15 Min      39385 (CPT®) Hc Pt Self Care/Mgmt/Train Ea 15 Min      29998 (CPT®) Hc Pt Prosthetic (S) Train Initial Encounter, Each 15 Min      23650 (CPT®) Hc Orthotic(S) Mgmt/Train Initial Encounter, Each 15min      26562 (CPT®) Hc Pt Aquatic Therapy Ea 15 Min      11917 (CPT®) Hc Pt Orthotic(S)/Prosthetic(S) Encounter, Each 15 Min      Total  35 2        Therapy Charges for Today     Code Description Service Date Service Provider Modifiers Qty    42383422591 HC PT MANUAL THERAPY EA 15 MIN 9/21/2018 Malaika Benson, PT GP 2                    Malaika Benson, PT  9/21/2018

## 2018-09-27 ENCOUNTER — HOSPITAL ENCOUNTER (OUTPATIENT)
Dept: PHYSICAL THERAPY | Facility: HOSPITAL | Age: 43
Setting detail: THERAPIES SERIES
Discharge: HOME | End: 2018-09-27
Attending: ORTHOPAEDIC SURGERY

## 2018-09-27 DIAGNOSIS — M54.6 LEFT-SIDED THORACIC BACK PAIN, UNSPECIFIED CHRONICITY: Primary | ICD-10-CM

## 2018-09-27 DIAGNOSIS — M54.2 NECK PAIN: ICD-10-CM

## 2018-09-27 PROCEDURE — 97110 THERAPEUTIC EXERCISES: CPT | Performed by: PHYSICAL THERAPIST

## 2018-09-27 PROCEDURE — 97140 MANUAL THERAPY 1/> REGIONS: CPT | Performed by: PHYSICAL THERAPIST

## 2018-09-27 NOTE — THERAPY TREATMENT NOTE
Outpatient Physical Therapy Ortho Treatment Note  [facility]     Patient Name: Romana Beavers  : 1975  MRN: [id number]  Today's Date: 2018      Visit Date: 2018    Visit Dx:    ICD-10-CM ICD-9-CM   1. Left-sided thoracic back pain, unspecified chronicity M54.6 724.1   2. Neck pain M54.2 723.1       There is no problem list on file for this patient.       Past Medical History:   Diagnosis Date   • Abdominal pain    • Acute pharyngitis    • Acute sinusitis    • Bronchitis    • Candidiasis of mouth    • Chronic rhinitis    • Cough    • Dermatitis    • Dysuria    • GERD (gastroesophageal reflux disease)    • Neuritis    • Pain of right upper arm    • Sprain, bicep         No past surgical history on file.                          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   -        PT Plan    PT Plan Comments MD gertrude next week  -       User Key  (r) = Recorded By, (t) = Taken By, (c) = Cosigned By    Initials Name Provider Type     Malaika Benson, PT Physical Therapist                    Exercises     Row Name 18 1100 18 1000          Subjective Comments    Subjective Comments  -- going to MD next week. Still having pain aloong the top of shoulder, into blade area  -        Subjective Pain    Able to rate subjective pain?  -- yes  -     Pre-Treatment Pain Level  -- 2  -        Total Minutes    08270 - PT Therapeutic Exercise Minutes  -- 20  -     33661 - PT Manual Therapy Minutes 23  -LH  --        Exercise 1    Exercise Name 1  -- shoulder roll, scap ret  -     Reps 1  -- 10  -        Exercise 2    Exercise Name 2  -- scap ret and shoulder ext  -     Cueing 2  -- Verbal;Demo  -     Sets 2  -- 2  -LH     Reps 2  -- 10  -     Additional Comments  -- orange  -         Exercise 3    Exercise Name 3  -- cat/camel  -     Sets 3  -- 1  -LH     Reps 3  -- 15  -LH        Exercise 4    Exercise Name 4  -- prayer stretch  -     Sets 4  -- 1  -LH     Reps 4  -- 3  -LH     Time 4  -- 20 sec  -LH        Exercise 5    Exercise Name 5  -- pect stretch in doorway  -     Sets 5  -- 1  -LH     Reps 5  -- 3  -LH     Time 5  -- 20 sec  -LH        Exercise 6    Exercise Name 6  -- supine horiz abd and D2 flexion  -     Cueing 6  -- Verbal  -     Sets 6  -- 1  -LH     Reps 6  -- 20  -LH     Additional Comments  -- orange  -       User Key  (r) = Recorded By, (t) = Taken By, (c) = Cosigned By    Initials Name Provider Type    Malaika Garcia, PT Physical Therapist                        Manual Rx (last 36 hours)      Manual Treatments     Row Name 09/27/18 1100             Total Minutes    58767 - PT Manual Therapy Minutes 23  -LH         Manual Rx 1    Manual Rx 1 Location B UT, L levator, and serratus post sup  -      Manual Rx 1 Type intramuscular manual therapy using direct techniques. LTRs were achieved. Palpation was done before, during and after tx. Precautions were taken over the lung field. Obtained verbal consent to treat  -      Manual Rx 1 Duration 10  -LH         Manual Rx 3    Manual Rx 3 Location B UT, levator  -      Manual Rx 3 Type STM following IMT with manual stretching to L UT  -      Manual Rx 3 Duration 13  -LH        User Key  (r) = Recorded By, (t) = Taken By, (c) = Cosigned By    Initials Name Provider Type     Malaika Benson PT Physical Therapist              Therapy Education  Given: Symptoms/condition management, Pain management, Posture/body mechanics  How Provided: Verbal  Provided to: Patient  Level of Understanding: Teach back education performed, Demonstrated              Time Calculation:   Start Time: 1015  Stop Time: 1100  Time Calculation (min): 45 min  Total Timed Code Minutes- PT: 43 minute(s)  Therapy Suggested Charges     Code    Minutes Charges    71986 (CPT®) Hc Pt Neuromusc Re Education Ea 15 Min      32732 (CPT®) Hc Pt Ther Proc Ea 15 Min 20 1    10793 (CPT®) Hc Gait Training Ea 15 Min      34844 (CPT®) Hc Pt Therapeutic Act Ea 15 Min      64710 (CPT®) Hc Pt Manual Therapy Ea 15 Min 23 2    07979 (CPT®) Hc Pt Ther Massage- Per 15 Min      66539 (CPT®) Hc Pt Iontophoresis Ea 15 Min      28942 (CPT®) Hc Pt Elec Stim Ea-Per 15 Min      41737 (CPT®) Hc Pt Ultrasound Ea 15 Min      84701 (CPT®) Hc Pt Self Care/Mgmt/Train Ea 15 Min      35701 (CPT®) Hc Pt Prosthetic (S) Train Initial Encounter, Each 15 Min      82112 (CPT®) Hc Orthotic(S) Mgmt/Train Initial Encounter, Each 15min      26791 (CPT®) Hc Pt Aquatic Therapy Ea 15 Min      10169 (CPT®) Hc Pt Orthotic(S)/Prosthetic(S) Encounter, Each 15 Min      Total  43 3        Therapy Charges for Today     Code Description Service Date Service Provider Modifiers Qty    91316067729 HC PT THER PROC EA 15 MIN 9/27/2018 Malaika Benson, PT GP 1    80358337207 HC PT MANUAL THERAPY EA 15 MIN 9/27/2018 Malaika Benson, PT GP 2                    Malaika Benson, PT  9/27/2018

## 2018-10-03 ENCOUNTER — OFFICE VISIT (OUTPATIENT)
Dept: ORTHOPEDIC SURGERY | Facility: CLINIC | Age: 43
End: 2018-10-03

## 2018-10-03 VITALS — HEIGHT: 65 IN | WEIGHT: 150 LBS | BODY MASS INDEX: 24.99 KG/M2 | TEMPERATURE: 98.1 F

## 2018-10-03 DIAGNOSIS — M54.2 CERVICAL SPINE PAIN: Primary | ICD-10-CM

## 2018-10-03 PROCEDURE — 99213 OFFICE O/P EST LOW 20 MIN: CPT | Performed by: ORTHOPAEDIC SURGERY

## 2018-10-03 PROCEDURE — 72040 X-RAY EXAM NECK SPINE 2-3 VW: CPT | Performed by: ORTHOPAEDIC SURGERY

## 2018-10-03 RX ORDER — MELOXICAM 15 MG/1
15 TABLET ORAL DAILY PRN
Qty: 30 TABLET | Refills: 2 | Status: SHIPPED | OUTPATIENT
Start: 2018-10-03 | End: 2021-10-07

## 2018-10-03 NOTE — PROGRESS NOTES
Patient:Romana Beavers    YOB: 1975    Medical Record Number:[id number]    Chief Complaints:  Neck and left shoulder pain    History of Present Illness:     43 y.o. female patient who presents for follow-up of her neck and left shoulder.  She describes moderate intermittent aching and burning pain from the neck down into the back of her shoulder blade.  She has been doing therapy.  It does not seem to be helping.  The meloxicam does help somewhat and she requested that I refill that today.  She denies any neurologic deficits or other complaints.    Allergies:  Allergies   Allergen Reactions   • Latex Rash       Home Medications:    Current Outpatient Prescriptions:   •  lamoTRIgine (LaMICtal) 100 MG tablet, Take 100 mg by mouth Daily., Disp: , Rfl:   •  meloxicam (MOBIC) 15 MG tablet, Take 1 tablet by mouth Daily. Take with food, Disp: 30 tablet, Rfl: 2  •  Omeprazole-Sodium Bicarbonate (ZEGERID)  MG capsule, Take  by mouth., Disp: , Rfl:   •  traZODone (DESYREL) 50 MG tablet, Take 50 mg by mouth Every Night., Disp: , Rfl:   •  zolpidem (AMBIEN) 10 MG tablet, Take  by mouth At Night As Needed for Sleep., Disp: , Rfl:     Past Medical History:   Diagnosis Date   • Abdominal pain    • Acute pharyngitis    • Acute sinusitis    • Bronchitis    • Candidiasis of mouth    • Chronic rhinitis    • Cough    • Dermatitis    • Dysuria    • GERD (gastroesophageal reflux disease)    • Neuritis    • Pain of right upper arm    • Sprain, bicep        No past surgical history on file.    Social History     Occupational History   • Not on file.     Social History Main Topics   • Smoking status: Never Smoker   • Smokeless tobacco: Not on file   • Alcohol use Yes   • Drug use: No   • Sexual activity: Not on file      Social History     Social History Narrative   • No narrative on file       History reviewed. No pertinent family history.    Review of Systems:      Constitutional: Denies fever, shaking or chills  "  Eyes: Denies change in visual acuity   HEENT: Denies nasal congestion or sore throat   Respiratory: Denies cough or shortness of breath   Cardiovascular: Denies chest pain or edema  Endocrine: Denies tremors, palpitations, intolerance of heat or cold, polyuria, polydipsia.  GI: Denies abdominal pain, nausea, vomiting, bloody stools or diarrhea  : Denies frequency, urgency, incontinence, retention, or nocturia.  Musculoskeletal: Denies numbness, tingling or loss of motor function except as above  Integument: Denies rash, lesion or ulceration   Neurologic: Denies headache or focal weakness, deficits  Heme: Denies spontaneous or excessive bleeding, epistaxis, hematuria, melena, fatigue, enlarged or tender lymph nodes.      All other pertinent positives and negatives as noted above in HPI.    Physical Exam:43 y.o. female  Vitals:    10/03/18 1539   Temp: 98.1 °F (36.7 °C)   Weight: 68 kg (150 lb)   Height: 165.1 cm (65\")       General:  Patient is awake and alert.  Appears in no acute distress or discomfort.    Psych:  Affect and demeanor are appropriate.    Neck: Skin is benign.  No tenderness or step-offs.  Good motion.  A Spurling's to the left reproduces her typical posterior shoulder and periscapular pain.    Extremities:  The left shoulder is briefly examined.  Skin is benign.  No atrophy, swelling or masses.  Mild tenderness along the superomedial border but nothing exquisite.  No crepitus.  No winging.  Full motion.  Negative impingement maneuvers.    Imaging:  AP and lateral views of the cervical spine are ordered and reviewed.  No comparison films are available.  I do not see any significant degenerative changes, malalignment or other concerning findings.      Assessment/Plan:  Neck and left shoulder pain, suspected cervical radiculopathy    I have agreed to refill her meloxicam.  The risk were discussed.  At this point, I recommend an MRI of the neck to evaluate for possible cervical radiculopathy.  She " agrees.  We will call her with the results.  We will come up with a plan pending review of that study.    Hola Shell MD    10/03/2018

## 2018-10-05 ENCOUNTER — HOSPITAL ENCOUNTER (OUTPATIENT)
Dept: PHYSICAL THERAPY | Facility: HOSPITAL | Age: 43
Setting detail: THERAPIES SERIES
Discharge: HOME | End: 2018-10-05
Attending: ORTHOPAEDIC SURGERY

## 2018-10-05 DIAGNOSIS — M54.6 LEFT-SIDED THORACIC BACK PAIN, UNSPECIFIED CHRONICITY: Primary | ICD-10-CM

## 2018-10-05 DIAGNOSIS — M54.2 NECK PAIN: ICD-10-CM

## 2018-10-05 PROCEDURE — 97140 MANUAL THERAPY 1/> REGIONS: CPT | Performed by: PHYSICAL THERAPIST

## 2018-10-05 PROCEDURE — 97110 THERAPEUTIC EXERCISES: CPT | Performed by: PHYSICAL THERAPIST

## 2018-10-05 NOTE — THERAPY TREATMENT NOTE
Outpatient Physical Therapy Ortho Treatment Note  [facility]     Patient Name: Romana Beavers  : 1975  MRN: [id number]  Today's Date: 10/5/2018      Visit Date: 10/05/2018    Visit Dx:    ICD-10-CM ICD-9-CM   1. Left-sided thoracic back pain, unspecified chronicity M54.6 724.1   2. Neck pain M54.2 723.1       There is no problem list on file for this patient.       Past Medical History:   Diagnosis Date   • Abdominal pain    • Acute pharyngitis    • Acute sinusitis    • Bronchitis    • Candidiasis of mouth    • Chronic rhinitis    • Cough    • Dermatitis    • Dysuria    • GERD (gastroesophageal reflux disease)    • Neuritis    • Pain of right upper arm    • Sprain, bicep         No past surgical history on file.                          PT Assessment/Plan     Row Name 10/05/18 1015          PT Assessment    Assessment Comments Patient notes increased pain and difficulty sleeping last night.  She does report some short term benefit following therapy sessions.  MD has ordered MRI since he didn't really see anything on x-ray.    -RA        PT Plan    PT Plan Comments Continue skilled therapy weekly as beneficial.  Await MRI results  -RA       User Key  (r) = Recorded By, (t) = Taken By, (c) = Cosigned By    Initials Name Provider Type    RA Antonia Elliott, PT Physical Therapist                    Exercises     Row Name 10/05/18 1000 10/05/18 0900          Subjective Comments    Subjective Comments saw MD, had neck x-ray, didn't really see anythng so he has ordered and MRI.  Think I'm supposed to continue with therapy  -RA  --        Subjective Pain    Able to rate subjective pain? yes  -RA  --     Pre-Treatment Pain Level 4  -RA  --        Total Minutes    99853 - PT Therapeutic Exercise Minutes 20  -RA  --     48363 - PT Manual Therapy Minutes  -- 26  -RA        Exercise 1    Exercise Name 1 shoulder roll, scap ret  -RA  --     Reps 1 10  -  --        Exercise 2    Exercise Name 2 scap ret and  shoulder ext  -RA  --     Cueing 2 Verbal;Demo  -RA  --     Sets 2 2  -RA  --     Reps 2 10  -RA  --     Additional Comments orange  -RA  --        Exercise 3    Exercise Name 3 cat/camel  -RA  --     Sets 3 1  -RA  --     Reps 3 15  -RA  --        Exercise 4    Exercise Name 4 prayer stretch  -RA  --     Sets 4 1  -RA  --     Reps 4 3 (1 ea fwd, R, and L)  -RA  --     Time 4 20 sec  -RA  --        Exercise 5    Exercise Name 5 pect stretch in doorway  -RA  --     Sets 5 1  -RA  --     Reps 5 3  -RA  --     Time 5 20 sec  -RA  --        Exercise 6    Exercise Name 6 supine horiz abd and D2 flexion  -RA  --     Cueing 6 Verbal  -RA  --     Sets 6 1  -RA  --     Reps 6 20  -RA  --     Additional Comments orange  -RA  --       User Key  (r) = Recorded By, (t) = Taken By, (c) = Cosigned By    Initials Name Provider Type    Antonia Weaver, PT Physical Therapist                        Manual Rx (last 36 hours)      Manual Treatments     Row Name 10/05/18 0900             Total Minutes    75102 - PT Manual Therapy Minutes 26  -RA         Manual Rx 1    Manual Rx 1 Location B UT, L levator, and L rhomboid tissues  -RA         Manual Rx 3    Manual Rx 3 Location B UT, levator  -RA      Manual Rx 3 Type STM following IMT with manual stretching to B UT  -RA        User Key  (r) = Recorded By, (t) = Taken By, (c) = Cosigned By    Initials Name Provider Type    Antonia Weaver, PT Physical Therapist              Therapy Education  Given: Symptoms/condition management, Pain management, Posture/body mechanics  Program: Reinforced  How Provided: Verbal  Provided to: Patient  Level of Understanding: Teach back education performed, Demonstrated              Time Calculation:   Start Time: 1015  Stop Time: 1103  Time Calculation (min): 48 min  Therapy Suggested Charges     Code   Minutes Charges    22815 (CPT®) Hc Pt Neuromusc Re Education Ea 15 Min      68838 (CPT®) Hc Pt Ther Proc Ea 15 Min 20 1    65448 (CPT®) Hc Gait  Training Ea 15 Min      29640 (CPT®) Hc Pt Therapeutic Act Ea 15 Min      04552 (CPT®) Hc Pt Manual Therapy Ea 15 Min 26 2    21422 (CPT®) Hc Pt Ther Massage- Per 15 Min      97960 (CPT®) Hc Pt Iontophoresis Ea 15 Min      41108 (CPT®) Hc Pt Elec Stim Ea-Per 15 Min      81583 (CPT®) Hc Pt Ultrasound Ea 15 Min      75512 (CPT®) Hc Pt Self Care/Mgmt/Train Ea 15 Min      78321 (CPT®) Hc Pt Prosthetic (S) Train Initial Encounter, Each 15 Min      96237 (CPT®) Hc Orthotic(S) Mgmt/Train Initial Encounter, Each 15min      74189 (CPT®) Hc Pt Aquatic Therapy Ea 15 Min      17879 (CPT®) Hc Pt Orthotic(S)/Prosthetic(S) Encounter, Each 15 Min       (CPT®) Hc Pt Electrical Stim Unattended      Total  46 3        Therapy Charges for Today     Code Description Service Date Service Provider Modifiers Qty    93416597535 HC PT THER PROC EA 15 MIN 10/5/2018 Antonia Elliott, PT GP 1    78401262539 HC PT MANUAL THERAPY EA 15 MIN 10/5/2018 Antonia Elliott, PT GP 2                    Antonia Elliott, PT  10/5/2018

## 2018-10-12 ENCOUNTER — HOSPITAL ENCOUNTER (OUTPATIENT)
Dept: PHYSICAL THERAPY | Facility: HOSPITAL | Age: 43
Setting detail: THERAPIES SERIES
Discharge: HOME | End: 2018-10-12
Attending: ORTHOPAEDIC SURGERY

## 2018-10-12 DIAGNOSIS — M54.2 NECK PAIN: ICD-10-CM

## 2018-10-12 DIAGNOSIS — M54.6 LEFT-SIDED THORACIC BACK PAIN, UNSPECIFIED CHRONICITY: Primary | ICD-10-CM

## 2018-10-12 PROCEDURE — 97110 THERAPEUTIC EXERCISES: CPT | Performed by: PHYSICAL THERAPIST

## 2018-10-12 PROCEDURE — 97140 MANUAL THERAPY 1/> REGIONS: CPT | Performed by: PHYSICAL THERAPIST

## 2018-10-12 NOTE — THERAPY TREATMENT NOTE
Outpatient Physical Therapy Ortho Treatment Note  [facility]     Patient Name: Romana Beavers  : 1975  MRN: [id number]  Today's Date: 10/12/2018      Visit Date: 10/12/2018    Visit Dx:    ICD-10-CM ICD-9-CM   1. Left-sided thoracic back pain, unspecified chronicity M54.6 724.1   2. Neck pain M54.2 723.1       There is no problem list on file for this patient.       Past Medical History:   Diagnosis Date   • Abdominal pain    • Acute pharyngitis    • Acute sinusitis    • Bronchitis    • Candidiasis of mouth    • Chronic rhinitis    • Cough    • Dermatitis    • Dysuria    • GERD (gastroesophageal reflux disease)    • Neuritis    • Pain of right upper arm    • Sprain, bicep         No past surgical history on file.                          PT Assessment/Plan     Row Name 10/12/18 1330          PT Assessment    Assessment Comments pt is still having pain, but it has improved over the past week. She is going to have an MRI of her neck next week. She is sleeping better and having less pain at the end of the work day.  -        PT Plan    PT Plan Comments cont. Will await MRI results  -       User Key  (r) = Recorded By, (t) = Taken By, (c) = Cosigned By    Initials Name Provider Type     Malaika Benson, PT Physical Therapist                    Exercises     Row Name 10/12/18 1300 10/12/18 1200          Subjective Comments    Subjective Comments  -- having MRI next week  -        Subjective Pain    Able to rate subjective pain?  -- yes  -     Pre-Treatment Pain Level  -- 4  -     Subjective Pain Comment  -- by the end of the work day no more than 3/10 over the past few day  -        Total Minutes    37295 - PT Therapeutic Exercise Minutes  -- 20  -     33216 - PT Manual Therapy Minutes 23  -LH  --        Exercise 1    Exercise Name 1  -- shoulder roll, scap ret  -     Reps 1  -- 10  -        Exercise 2    Exercise Name 2  -- scap ret and shoulder ext  -     Cueing 2  -- Verbal;Km   -LH     Sets 2  -- 2  -LH     Reps 2  -- 10  -LH     Additional Comments  -- orange  -LH        Exercise 3    Exercise Name 3  -- cat/camel  -LH     Sets 3  -- 1  -LH     Reps 3  -- 15  -LH        Exercise 4    Exercise Name 4  -- prayer stretch  -LH     Sets 4  -- 1  -LH     Reps 4  -- 3 (1 ea fwd, R, and L)  -LH     Time 4  -- 20 sec  -LH        Exercise 5    Exercise Name 5  -- pect stretch in doorway  -LH     Sets 5  -- 1  -LH     Reps 5  -- 3  -LH     Time 5  -- 20 sec  -LH        Exercise 6    Exercise Name 6  -- supine horiz abd and D2 flexion  -     Cueing 6  -- Verbal  -LH     Sets 6  -- 1  -LH     Reps 6  -- 20  -LH        Exercise 7    Exercise Name 7  -- wall push ups  -LH     Sets 7  -- 1  -LH     Reps 7  -- 20  -LH        Exercise 8    Exercise Name 8  -- supine B shoulder ER  -LH     Sets 8  -- 2  -LH     Reps 8  -- 10  -LH     Additional Comments  -- orange  -LH       User Key  (r) = Recorded By, (t) = Taken By, (c) = Cosigned By    Initials Name Provider Type     Malaika Benson, PT Physical Therapist                        Manual Rx (last 36 hours)      Manual Treatments     Row Name 10/12/18 1300             Total Minutes    36394 - PT Manual Therapy Minutes 23  -LH         Manual Rx 1    Manual Rx 1 Location B UT, L levator, rhomboid, and serratus post sup  -LH      Manual Rx 1 Type intramuscular manual therapy using direct techniques. LTRs were achieved. Palpation was done before, during and after tx. Precautions were taken over the lung field. Obtained verbal consent to treat  -      Manual Rx 1 Duration 10  -LH         Manual Rx 3    Manual Rx 3 Location B UT, levator  -      Manual Rx 3 Type STM following IMT with manual stretching to L UT  -      Manual Rx 3 Duration 13  -LH        User Key  (r) = Recorded By, (t) = Taken By, (c) = Cosigned By    Initials Name Provider Type     Malaika Benson PT Physical Therapist              Therapy Education  Given: Symptoms/condition  management, Pain management, Posture/body mechanics, HEP  How Provided: Verbal, Demonstration  Provided to: Patient  Level of Understanding: Teach back education performed, Verbalized              Time Calculation:   Start Time: 1247  Stop Time: 1330  Time Calculation (min): 43 min  Total Timed Code Minutes- PT: 43 minute(s)  Therapy Suggested Charges     Code   Minutes Charges    94791 (CPT®) Hc Pt Neuromusc Re Education Ea 15 Min      83453 (CPT®) Hc Pt Ther Proc Ea 15 Min 20 1    61243 (CPT®) Hc Gait Training Ea 15 Min      08691 (CPT®) Hc Pt Therapeutic Act Ea 15 Min      12926 (CPT®) Hc Pt Manual Therapy Ea 15 Min 23 2    78971 (CPT®) Hc Pt Ther Massage- Per 15 Min      53088 (CPT®) Hc Pt Iontophoresis Ea 15 Min      55889 (CPT®) Hc Pt Elec Stim Ea-Per 15 Min      73332 (CPT®) Hc Pt Ultrasound Ea 15 Min      09714 (CPT®) Hc Pt Self Care/Mgmt/Train Ea 15 Min      62154 (CPT®) Hc Pt Prosthetic (S) Train Initial Encounter, Each 15 Min      75316 (CPT®) Hc Orthotic(S) Mgmt/Train Initial Encounter, Each 15min      08796 (CPT®) Hc Pt Aquatic Therapy Ea 15 Min      74826 (CPT®) Hc Pt Orthotic(S)/Prosthetic(S) Encounter, Each 15 Min       (CPT®) Hc Pt Electrical Stim Unattended      Total  43 3        Therapy Charges for Today     Code Description Service Date Service Provider Modifiers Qty    01261260442 HC PT THER PROC EA 15 MIN 10/12/2018 Malaika Benson, PT GP 1    64635976909 HC PT MANUAL THERAPY EA 15 MIN 10/12/2018 Malaika Benson, PT GP 2                    Malaika Benson, PT  10/12/2018

## 2018-10-15 ENCOUNTER — APPOINTMENT (OUTPATIENT)
Dept: MRI IMAGING | Facility: HOSPITAL | Age: 43
End: 2018-10-15
Attending: ORTHOPAEDIC SURGERY

## 2018-10-23 ENCOUNTER — TELEPHONE (OUTPATIENT)
Dept: ORTHOPEDIC SURGERY | Facility: CLINIC | Age: 43
End: 2018-10-23

## 2018-10-26 DIAGNOSIS — M54.2 CERVICAL SPINE PAIN: ICD-10-CM

## 2018-10-30 ENCOUNTER — TELEPHONE (OUTPATIENT)
Dept: ORTHOPEDIC SURGERY | Facility: HOSPITAL | Age: 43
End: 2018-10-30

## 2018-10-30 DIAGNOSIS — M54.12 CERVICAL RADICULOPATHY: Primary | ICD-10-CM

## 2018-10-30 NOTE — TELEPHONE ENCOUNTER
I spoke to MsEsvin Ruthann. I provided her with MRI results per Dr. Shell. She has pinched nerves in her neck and he recommends epidural injections. I encouraged her to follow up with Dr. Shell after the injections to let him know if these provided relief. If not, he is happy to refer her to Dr. Schultz for further evaluation and treatment. She acknowledged understanding and appreciated the call.

## 2018-11-13 ENCOUNTER — DOCUMENTATION (OUTPATIENT)
Dept: PHYSICAL THERAPY | Facility: HOSPITAL | Age: 43
End: 2018-11-13

## 2018-11-13 DIAGNOSIS — M54.6 LEFT-SIDED THORACIC BACK PAIN, UNSPECIFIED CHRONICITY: Primary | ICD-10-CM

## 2018-11-13 DIAGNOSIS — M54.2 NECK PAIN: ICD-10-CM

## 2018-11-13 NOTE — THERAPY DISCHARGE NOTE
Outpatient Physical Therapy Discharge Summary         Patient Name: Romana Beavers  : 1975  MRN: [id number]    Today's Date: 2018    Visit Dx:    ICD-10-CM ICD-9-CM   1. Left-sided thoracic back pain, unspecified chronicity M54.6 724.1   2. Neck pain M54.2 723.1       PT OP Goals     Row Name 18 1000          PT Short Term Goals    STG 1  Patient will be independent with education for symptom management, joint protection and strategies to minimize stress on affected tissues  -     STG 1 Progress  Partially Met  -     STG 2  Pt to improve pain complaints to no more than 4/10  -     STG 2 Progress  Not Met  -        Long Term Goals    LTG 1  Pt to improve pain complaints to no more than 2/10  -     LTG 1 Progress  Not Met  -     LTG 2  Pt to improve NDI score from 40% to 20% for overall functional improvement  -     LTG 2 Progress  Not Met  -     LTG 3  Patient will achieve 5 hours of uninterrupted sleep without increases in pain.  -     LTG 3 Progress  Not Met  -     LTG 4  Patient will be independent with progressive home exercise program for improved functional mobility and stability of the spine.  -     LTG 4 Progress  Partially Met  -       User Key  (r) = Recorded By, (t) = Taken By, (c) = Cosigned By    Initials Name Provider Type    Malaika Garcia, PT Physical Therapist          OP PT Discharge Summary  Date of Discharge: 18  Reason for Discharge: Lack of progress  Outcomes Achieved: Unable to make functional progress toward goals at this time  Discharge Destination: Home with home program  Discharge Instructions/Additional Comments: Pt was referred back to MD for MRI and now to pain management      Time Calculation:   Start Time: 1038    Therapy Suggested Charges     Code   Minutes Charges    None                       Malaika Benson, PT  2018

## 2018-12-10 ENCOUNTER — APPOINTMENT (OUTPATIENT)
Dept: PAIN MEDICINE | Facility: HOSPITAL | Age: 43
End: 2018-12-10

## 2018-12-27 ENCOUNTER — ANESTHESIA EVENT (OUTPATIENT)
Dept: PAIN MEDICINE | Facility: HOSPITAL | Age: 43
End: 2018-12-27

## 2018-12-27 ENCOUNTER — HOSPITAL ENCOUNTER (OUTPATIENT)
Dept: PAIN MEDICINE | Facility: HOSPITAL | Age: 43
Discharge: HOME | End: 2018-12-27
Admitting: ANESTHESIOLOGY

## 2018-12-27 ENCOUNTER — HOSPITAL ENCOUNTER (OUTPATIENT)
Dept: GENERAL RADIOLOGY | Facility: HOSPITAL | Age: 43
Discharge: HOME | End: 2018-12-27

## 2018-12-27 ENCOUNTER — ANESTHESIA (OUTPATIENT)
Dept: PAIN MEDICINE | Facility: HOSPITAL | Age: 43
End: 2018-12-27

## 2018-12-27 VITALS
WEIGHT: 145 LBS | OXYGEN SATURATION: 99 % | BODY MASS INDEX: 25.69 KG/M2 | SYSTOLIC BLOOD PRESSURE: 121 MMHG | RESPIRATION RATE: 16 BRPM | HEIGHT: 63 IN | HEART RATE: 66 BPM | TEMPERATURE: 98 F | DIASTOLIC BLOOD PRESSURE: 93 MMHG

## 2018-12-27 DIAGNOSIS — R52 PAIN: ICD-10-CM

## 2018-12-27 DIAGNOSIS — M54.12 CERVICAL RADICULOPATHY: ICD-10-CM

## 2018-12-27 PROCEDURE — 0 IOPAMIDOL 41 % SOLUTION: Performed by: ANESTHESIOLOGY

## 2018-12-27 PROCEDURE — C1755 CATHETER, INTRASPINAL: HCPCS

## 2018-12-27 PROCEDURE — 77003 FLUOROGUIDE FOR SPINE INJECT: CPT

## 2018-12-27 PROCEDURE — 25010000002 DEXAMETHASONE SODIUM PHOSPHATE 10 MG/ML SOLUTION: Performed by: ANESTHESIOLOGY

## 2018-12-27 PROCEDURE — 25010000002 MIDAZOLAM PER 1 MG: Performed by: ANESTHESIOLOGY

## 2018-12-27 RX ORDER — MIDAZOLAM HYDROCHLORIDE 1 MG/ML
1 INJECTION INTRAMUSCULAR; INTRAVENOUS AS NEEDED
Status: DISCONTINUED | OUTPATIENT
Start: 2018-12-27 | End: 2018-12-28 | Stop reason: HOSPADM

## 2018-12-27 RX ORDER — DEXAMETHASONE SODIUM PHOSPHATE 10 MG/ML
10 INJECTION, SOLUTION INTRAMUSCULAR; INTRAVENOUS ONCE
Status: COMPLETED | OUTPATIENT
Start: 2018-12-27 | End: 2018-12-27

## 2018-12-27 RX ORDER — HYDROXYZINE HYDROCHLORIDE 25 MG/ML
INJECTION, SOLUTION INTRAMUSCULAR
COMMUNITY
Start: 2018-10-27 | End: 2021-10-07

## 2018-12-27 RX ORDER — SODIUM CHLORIDE 0.9 % (FLUSH) 0.9 %
3 SYRINGE (ML) INJECTION EVERY 12 HOURS SCHEDULED
Status: DISCONTINUED | OUTPATIENT
Start: 2018-12-27 | End: 2018-12-28 | Stop reason: HOSPADM

## 2018-12-27 RX ORDER — FENTANYL CITRATE 50 UG/ML
50 INJECTION, SOLUTION INTRAMUSCULAR; INTRAVENOUS AS NEEDED
Status: DISCONTINUED | OUTPATIENT
Start: 2018-12-27 | End: 2018-12-28 | Stop reason: HOSPADM

## 2018-12-27 RX ORDER — SODIUM CHLORIDE 0.9 % (FLUSH) 0.9 %
3-10 SYRINGE (ML) INJECTION AS NEEDED
Status: DISCONTINUED | OUTPATIENT
Start: 2018-12-27 | End: 2018-12-28 | Stop reason: HOSPADM

## 2018-12-27 RX ORDER — SODIUM CHLORIDE 0.9 % (FLUSH) 0.9 %
1-10 SYRINGE (ML) INJECTION AS NEEDED
Status: DISCONTINUED | OUTPATIENT
Start: 2018-12-27 | End: 2018-12-28 | Stop reason: HOSPADM

## 2018-12-27 RX ORDER — LIDOCAINE HYDROCHLORIDE 10 MG/ML
1 INJECTION, SOLUTION INFILTRATION; PERINEURAL ONCE AS NEEDED
Status: DISCONTINUED | OUTPATIENT
Start: 2018-12-27 | End: 2018-12-28 | Stop reason: HOSPADM

## 2018-12-27 RX ADMIN — DEXAMETHASONE SODIUM PHOSPHATE 10 MG: 10 INJECTION, SOLUTION INTRAMUSCULAR; INTRAVENOUS at 12:08

## 2018-12-27 RX ADMIN — MIDAZOLAM HYDROCHLORIDE 2 MG: 2 INJECTION, SOLUTION INTRAMUSCULAR; INTRAVENOUS at 11:58

## 2018-12-27 RX ADMIN — IOPAMIDOL 10 ML: 408 INJECTION, SOLUTION INTRATHECAL at 12:07

## 2018-12-27 NOTE — ANESTHESIA PROCEDURE NOTES
PAIN Epidural block      Patient reassessed immediately prior to procedure    Patient location during procedure: pain clinic  Start Time: 12/27/2018 11:54 AM  Stop Time: 12/27/2018 12:10 PM  Indication:procedure for pain  Performed By  Anesthesiologist: Leonid Tesfaye MD  Preanesthetic Checklist  Completed: patient identified, site marked, surgical consent, pre-op evaluation, timeout performed, risks and benefits discussed and monitors and equipment checked  Additional Notes  Post-Op Diagnosis Codes:     * Degeneration of cervical intervertebral disc (M50.30)     * Cervical radiculopathy (M54.12)  Prep:  Pt Position:prone  Sterile Tech:cap, gloves, mask and sterile barrier  Prep:chlorhexidine gluconate and isopropyl alcohol  Monitoring:blood pressure monitoring, continuous pulse oximetry and EKG  Procedure:  Sedation: yes   Approach:midline  Guidance: fluoroscopy  Location:cervical  Interspace: Interlaminar C7-T1.  Needle Type:Tuohy  Needle Gauge:20 G  Aspiration:negative  Medications:  Depomedrol:80 mg  Preservative Free Saline:3mL  Isovue:2mL  Comments:Epidural spread of contrast  Post Assessment:  Dressing:occlusive dressing applied  Pt Tolerance:patient tolerated the procedure well with no apparent complications  Complications:no

## 2018-12-27 NOTE — H&P
[facility]    History and Physical    Patient Name: Romana Beavers  :  1975  MRN:  [id number]  Date of Admission: 2018    Subjective     Patient is a 43 y.o. female presents with chief complaint of chronic, constant, moderate, severe neck and left shoulder pain.  Onset of symptoms was gradual starting 4 months ago.  Symptoms are associated/aggravated by lifting. Symptoms improve with message, ice and lying down.  On a pain scale from 0-10, she rates her pain as a 7 while at rest and 8 with activity.  She describes the pain as burning and aching in nature.    Her cervical MRI shows broad base disc bulges at C5 6 and C6 7 effacing the ventral cord.    The following portions of the patients history were reviewed and updated as appropriate: current medications, allergies, past medical history, past surgical history, past family history, past social history and problem list                Objective     Past Medical History:   Past Medical History:   Diagnosis Date   • Abdominal pain    • Acute pharyngitis    • Acute sinusitis    • Bronchitis    • Candidiasis of mouth    • Chronic rhinitis    • Cough    • Dermatitis    • Dysuria    • GERD (gastroesophageal reflux disease)    • Neuritis    • Pain of right upper arm    • Sprain, bicep      Past Surgical History: No past surgical history on file.  Family History: No family history on file.  Social History:   Social History     Tobacco Use   • Smoking status: Never Smoker   Substance Use Topics   • Alcohol use: Yes   • Drug use: No       Vital Signs Range for the last 24 hours  Temperature:     Temp Source:     BP:     Pulse:     Respirations:     SPO2:     O2 Amount (l/min):     O2 Devices     Weight:           --------------------------------------------------------------------------------    Current Outpatient Medications   Medication Sig Dispense Refill   • lamoTRIgine (LaMICtal) 100 MG tablet Take 100 mg by mouth Daily.     • meloxicam (MOBIC) 15 MG  tablet Take 1 tablet by mouth Daily. Take with food 30 tablet 2   • meloxicam (MOBIC) 15 MG tablet Take 1 tablet by mouth Daily As Needed for Moderate Pain . Take as directed with food. 30 tablet 2   • Omeprazole-Sodium Bicarbonate (ZEGERID)  MG capsule Take  by mouth.     • traZODone (DESYREL) 50 MG tablet Take 50 mg by mouth Every Night.     • zolpidem (AMBIEN) 10 MG tablet Take  by mouth At Night As Needed for Sleep.       No current facility-administered medications for this encounter.        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, Driving and Working  Modalities previously tried to control pain with limited effectiveness within the last 4-6 weeks: Ice, Heat, Prescription medications, Physical therapy and Other     Airway   Mallampati: II  TM distance: >3 FB  Neck ROM: full  Dental - normal exam     Pulmonary - negative pulmonary ROS and normal exam    breath sounds clear to auscultation  Cardiovascular - negative cardio ROS and normal exam  Exercise tolerance: good (4-7 METS)    Rhythm: regular  Rate: normal    (-) angina, orthopnea, PND, CERON      Neuro/Psych- negative ROS  GI/Hepatic/Renal/Endo    (+)  GERD,      Musculoskeletal (-) negative ROS    Abdominal  - normal exam    Abdomen: soft.  Bowel sounds: normal.   Substance History - negative use     OB/GYN negative ob/gyn ROS         Other - negative ROS           Phys Exam Other:   NECK:  Supple without adenopathy, JVD or bruits.    EXTREMITIES:  There is no clubbing, cyanosis or edema.  Radial pulses are 1+ and equal bilaterally.  Examination of the cervical spine shows no marked tenderness or discoloration.    SKIN:  Warm and dry.    NEUROLOGICAL EXAM:  Alert and oriented ×3.  Extraocular muscles  intact.  Strength is normal and symmetrical in the upper extremities with respect to biceps, triceps,  strength and shoulder shrug.           Diagnosis and Plan    Treatment Plan  ASA 2      Procedures: Cervical Epidural Steroid Injection(MOLLY), With fluoroscopy,       Anesthetic plan and risks discussed with patient.        Alternative management options include physical therapy, medical management with nonsteroidal anti-inflammatory medications or narcotics, chiropractic manipulation and surgical intervention.    Diagnosis     * Degeneration of cervical intervertebral disc [M50.30]     * Cervical radiculopathy [M54.12]

## 2021-04-06 ENCOUNTER — BULK ORDERING (OUTPATIENT)
Dept: CASE MANAGEMENT | Facility: OTHER | Age: 46
End: 2021-04-06

## 2021-04-06 DIAGNOSIS — Z23 IMMUNIZATION DUE: ICD-10-CM

## 2021-10-07 ENCOUNTER — OFFICE VISIT (OUTPATIENT)
Dept: INTERNAL MEDICINE | Facility: CLINIC | Age: 46
End: 2021-10-07

## 2021-10-07 VITALS
BODY MASS INDEX: 26.29 KG/M2 | TEMPERATURE: 99.3 F | HEART RATE: 73 BPM | DIASTOLIC BLOOD PRESSURE: 70 MMHG | OXYGEN SATURATION: 99 % | SYSTOLIC BLOOD PRESSURE: 126 MMHG | HEIGHT: 64 IN | WEIGHT: 154 LBS

## 2021-10-07 DIAGNOSIS — Z00.00 ROUTINE ADULT HEALTH MAINTENANCE: Primary | ICD-10-CM

## 2021-10-07 DIAGNOSIS — N63.20 LEFT BREAST MASS: ICD-10-CM

## 2021-10-07 PROCEDURE — 99386 PREV VISIT NEW AGE 40-64: CPT | Performed by: NURSE PRACTITIONER

## 2021-10-07 PROCEDURE — 99213 OFFICE O/P EST LOW 20 MIN: CPT | Performed by: NURSE PRACTITIONER

## 2021-10-07 RX ORDER — DIAZEPAM 5 MG/1
5 TABLET ORAL NIGHTLY PRN
COMMUNITY
Start: 2021-08-28

## 2021-10-07 NOTE — PROGRESS NOTES
"Chief Complaint  Establish Care and Mass (lump felt in left breast, tender)    Subjective          Romana Beavers presents to [facility]  History of Present Illness   This is a 45 y/o female presenting to office to establish care, physical exam, and left breast lump. Patient currently  and has three step children. 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     Patient reports current exercise-- patient reports once a week of exercise. Patient reports following overall healthy diet.     Patient denies nicotine use. Patient reports alcohol use-- social occasional use. Patient denies use of illicit drugs.     Patient reports current sexually active. Patient denies use of birth control. Patient does not remember when actual date of pap smear completed. Patient has not had mammogram either.      Patient reports feeling quarter sized bump in left breast at 9 pm. Patient reports finding this yesterday. Patient reports it does not hurt. Patient has not had mammogram before.        Objective   Vital Signs:   /70   Pulse 73   Temp 99.3 °F (37.4 °C)   Ht 161.3 cm (63.5\")   Wt 69.9 kg (154 lb)   SpO2 99%   BMI 26.85 kg/m²     Physical Exam  Vitals and nursing note reviewed.   Constitutional:       Appearance: Normal appearance.   HENT:      Head: Normocephalic and atraumatic.   Eyes:      Extraocular Movements: Extraocular movements intact.      Conjunctiva/sclera: Conjunctivae normal.      Pupils: Pupils are equal, round, and reactive to light.   Cardiovascular:      Rate and Rhythm: Normal rate and regular rhythm.      Pulses: Normal pulses.      Heart sounds: Normal heart sounds. No murmur heard.   No friction rub. No gallop.    Pulmonary:      Effort: Pulmonary effort is normal. No respiratory distress.      Breath sounds: Normal breath sounds. No stridor. No wheezing, rhonchi or rales.   Chest:      Breasts:         Right: Normal.         Left: Mass present.       Abdominal: "      General: Bowel sounds are normal. There is no distension.      Palpations: Abdomen is soft. There is no mass.      Tenderness: There is no abdominal tenderness.      Hernia: No hernia is present.   Musculoskeletal:         General: Normal range of motion.      Cervical back: Normal range of motion and neck supple.   Skin:     General: Skin is dry.      Capillary Refill: Capillary refill takes less than 2 seconds.   Neurological:      General: No focal deficit present.      Mental Status: She is alert and oriented to person, place, and time. Mental status is at baseline.   Psychiatric:         Mood and Affect: Mood normal.         Behavior: Behavior normal.         Thought Content: Thought content normal.         Judgment: Judgment normal.        Result Review :            Assessment and Plan    Diagnoses and all orders for this visit:    1. Routine adult health maintenance (Primary)  Assessment & Plan:  Encouraged 150 minutes weekly exercise.   Continue with healthy diet choices according to [employer] food guidance.   A1C/Vit D level/CBC/CMP/Hep C screening.  Pap smear to be done this year.   Mammogram ordered with ultrasound.  Anticipatory guidance given regarding health prevention/wellness, diet/exercise, tobacco/alcohol/drug education, exercise and wellbeing, covid 19 guidance, and sexual health/STD education.           2. Left breast mass  Assessment & Plan:  Mammogram and ultrasound ordered.         Follow Up   No follow-ups on file.  Patient was given instructions and counseling regarding her condition or for health maintenance advice. Please see specific information pulled into the AVS if appropriate.

## 2021-10-07 NOTE — ASSESSMENT & PLAN NOTE
Encouraged 150 minutes weekly exercise.   Continue with healthy diet choices according to [employer] food guidance.   A1C/Vit D level/CBC/CMP/Hep C screening.  Pap smear to be done this year.   Mammogram ordered with ultrasound.  Anticipatory guidance given regarding health prevention/wellness, diet/exercise, tobacco/alcohol/drug education, exercise and wellbeing, covid 19 guidance, and sexual health/STD education.

## 2021-10-07 NOTE — PATIENT INSTRUCTIONS
Preventive Care 40-64 Years Old, Female  Preventive care refers to visits with your health care provider and lifestyle choices that can promote health and wellness. This includes:  · A yearly physical exam. This may also be called an annual well check.  · Regular dental visits and eye exams.  · Immunizations.  · Screening for certain conditions.  · Healthy lifestyle choices, such as eating a healthy diet, getting regular exercise, not using drugs or products that contain nicotine and tobacco, and limiting alcohol use.  What can I expect for my preventive care visit?  Physical exam  Your health care provider will check your:  · Height and weight. This may be used to calculate body mass index (BMI), which tells if you are at a healthy weight.  · Heart rate and blood pressure.  · Skin for abnormal spots.  Counseling  Your health care provider may ask you questions about your:  · Alcohol, tobacco, and drug use.  · Emotional well-being.  · Home and relationship well-being.  · Sexual activity.  · Eating habits.  · Work and work environment.  · Method of birth control.  · Menstrual cycle.  · Pregnancy history.  What immunizations do I need?    Influenza (flu) vaccine  · This is recommended every year.  Tetanus, diphtheria, and pertussis (Tdap) vaccine  · You may need a Td booster every 10 years.  Varicella (chickenpox) vaccine  · You may need this if you have not been vaccinated.  Zoster (shingles) vaccine  · You may need this after age 60.  Measles, mumps, and rubella (MMR) vaccine  · You may need at least one dose of MMR if you were born in 1957 or later. You may also need a second dose.  Pneumococcal conjugate (PCV13) vaccine  · You may need this if you have certain conditions and were not previously vaccinated.  Pneumococcal polysaccharide (PPSV23) vaccine  · You may need one or two doses if you smoke cigarettes or if you have certain conditions.  Meningococcal conjugate (MenACWY) vaccine  · You may need this if you  have certain conditions.  Hepatitis A vaccine  · You may need this if you have certain conditions or if you travel or work in places where you may be exposed to hepatitis A.  Hepatitis B vaccine  · You may need this if you have certain conditions or if you travel or work in places where you may be exposed to hepatitis B.  Haemophilus influenzae type b (Hib) vaccine  · You may need this if you have certain conditions.  Human papillomavirus (HPV) vaccine  · If recommended by your health care provider, you may need three doses over 6 months.  You may receive vaccines as individual doses or as more than one vaccine together in one shot (combination vaccines). Talk with your health care provider about the risks and benefits of combination vaccines.  What tests do I need?  Blood tests  · Lipid and cholesterol levels. These may be checked every 5 years, or more frequently if you are over 50 years old.  · Hepatitis C test.  · Hepatitis B test.  Screening  · Lung cancer screening. You may have this screening every year starting at age 55 if you have a 30-pack-year history of smoking and currently smoke or have quit within the past 15 years.  · Colorectal cancer screening. All adults should have this screening starting at age 50 and continuing until age 75. Your health care provider may recommend screening at age 45 if you are at increased risk. You will have tests every 1-10 years, depending on your results and the type of screening test.  · Diabetes screening. This is done by checking your blood sugar (glucose) after you have not eaten for a while (fasting). You may have this done every 1-3 years.  · Mammogram. This may be done every 1-2 years. Talk with your health care provider about when you should start having regular mammograms. This may depend on whether you have a family history of breast cancer.  · BRCA-related cancer screening. This may be done if you have a family history of breast, ovarian, tubal, or peritoneal  cancers.  · Pelvic exam and Pap test. This may be done every 3 years starting at age 21. Starting at age 30, this may be done every 5 years if you have a Pap test in combination with an HPV test.  Other tests  · Sexually transmitted disease (STD) testing.  · Bone density scan. This is done to screen for osteoporosis. You may have this scan if you are at high risk for osteoporosis.  Follow these instructions at home:  Eating and drinking  · Eat a diet that includes fresh fruits and vegetables, whole grains, lean protein, and low-fat dairy.  · Take vitamin and mineral supplements as recommended by your health care provider.  · Do not drink alcohol if:  ? Your health care provider tells you not to drink.  ? You are pregnant, may be pregnant, or are planning to become pregnant.  · If you drink alcohol:  ? Limit how much you have to 0-1 drink a day.  ? Be aware of how much alcohol is in your drink. In the [place] one drink equals one 12 oz bottle of beer (355 mL), one 5 oz glass of wine (148 mL), or one 1½ oz glass of hard liquor (44 mL).  Lifestyle  · Take daily care of your teeth and gums.  · Stay active. Exercise for at least 30 minutes on 5 or more days each week.  · Do not use any products that contain nicotine or tobacco, such as cigarettes, e-cigarettes, and chewing tobacco. If you need help quitting, ask your health care provider.  · If you are sexually active, practice safe sex. Use a condom or other form of birth control (contraception) in order to prevent pregnancy and STIs (sexually transmitted infections).  · If told by your health care provider, take low-dose aspirin daily starting at age 50.  What's next?  · Visit your health care provider once a year for a well check visit.  · Ask your health care provider how often you should have your eyes and teeth checked.  · Stay up to date on all vaccines.  This information is not intended to replace advice given to you by your health care provider. Make sure you  discuss any questions you have with your health care provider.  Document Revised: 08/29/2019 Document Reviewed: 08/29/2019  [employer] Patient Education © 2020 [employer]    Fibroadenoma  A fibroadenoma is a lump (tumor) in the breast. The lump is benign. This means that it is not cancer. It may move under your skin when you touch it. This kind of lump can grow in one breast or in both breasts.  The cause of this condition is not known. Some of the lumps are too small to be felt. Others can be felt as firm, round, smooth, or movable lumps.  This kind of lump can be treated with regular breast exams. Exams are done to check for changes in the tumor. In some cases, the tumor is removed. The tumor can be removed if:  · It is large.  · It continues to grow.  · It is causing pain or changes in the skin of the breast.  · The patient is an adolescent girl. Lumps in young girls tend to grow over time.  Follow these instructions at home:  Breast exams    · Check your breasts at home as told by your doctor. Report any changes or concerns. Check for the following:  ? The size of the tumor.  ? The look and feel of the skin of your breasts.  ? The look and feel of your nipples.    General instructions  · If you had a lump removed, follow instructions from your doctor for home care after surgery.  · Do not use any products that contain nicotine or tobacco, such as cigarettes and e-cigarettes. These can further increase your cancer risk. If you need help quitting, ask your doctor.  · Keep all follow-up visits as told by your doctor. This is important. You will need breast exams on a regular basis.  Contact a doctor if:  · The lump changes in size or feels different.  · The lump starts to be painful.  · You find a new lump.  · You have any changes in the skin that covers your breast.  · You have any changes in your nipple.  · You have fluid leaking from your nipple.  · You have redness around your nipple.  Summary  · A  fibroadenoma is a lump (tumor) in the breast. The lump is benign. This means that it is not cancer.  · This tumor may feel like a firm, round, smooth, and movable lump in your breast. Some fibroadenomas are too small to be felt.  · Having this condition may slightly increase your risk for developing breast cancer in the future.  · Do breast exams at home. Watch for changes in the size of the lump. Also, watch for changes in the look and feel of your nipples and the skin of your breast.  · Contact your doctor if the lump grows bigger or starts to cause pain. Also, let your doctor know if there is a change in the way your nipples look and in the feel of the skin of your breast.  This information is not intended to replace advice given to you by your health care provider. Make sure you discuss any questions you have with your health care provider.  Document Revised: 12/31/2018 Document Reviewed: 12/31/2018  [employer] Patient Education © 2021 [employer]    Health Maintenance, Female  Adopting a healthy lifestyle and getting preventive care are important in promoting health and wellness. Ask your health care provider about:  · The right schedule for you to have regular tests and exams.  · Things you can do on your own to prevent diseases and keep yourself healthy.  What should I know about diet, weight, and exercise?  Eat a healthy diet    · Eat a diet that includes plenty of vegetables, fruits, low-fat dairy products, and lean protein.  · Do not eat a lot of foods that are high in solid fats, added sugars, or sodium.    Maintain a healthy weight  Body mass index (BMI) is used to identify weight problems. It estimates body fat based on height and weight. Your health care provider can help determine your BMI and help you achieve or maintain a healthy weight.  Get regular exercise  Get regular exercise. This is one of the most important things you can do for your health. Most adults should:  · Exercise for at least 150  minutes each week. The exercise should increase your heart rate and make you sweat (moderate-intensity exercise).  · Do strengthening exercises at least twice a week. This is in addition to the moderate-intensity exercise.  · Spend less time sitting. Even light physical activity can be beneficial.  Watch cholesterol and blood lipids  Have your blood tested for lipids and cholesterol at 20 years of age, then have this test every 5 years.  Have your cholesterol levels checked more often if:  · Your lipid or cholesterol levels are high.  · You are older than 40 years of age.  · You are at high risk for heart disease.  What should I know about cancer screening?  Depending on your health history and family history, you may need to have cancer screening at various ages. This may include screening for:  · Breast cancer.  · Cervical cancer.  · Colorectal cancer.  · Skin cancer.  · Lung cancer.  What should I know about heart disease, diabetes, and high blood pressure?  Blood pressure and heart disease  · High blood pressure causes heart disease and increases the risk of stroke. This is more likely to develop in people who have high blood pressure readings, are of  descent, or are overweight.  · Have your blood pressure checked:  ? Every 3-5 years if you are 18-39 years of age.  ? Every year if you are 40 years old or older.  Diabetes  Have regular diabetes screenings. This checks your fasting blood sugar level. Have the screening done:  · Once every three years after age 40 if you are at a normal weight and have a low risk for diabetes.  · More often and at a younger age if you are overweight or have a high risk for diabetes.  What should I know about preventing infection?  Hepatitis B  If you have a higher risk for hepatitis B, you should be screened for this virus. Talk with your health care provider to find out if you are at risk for hepatitis B infection.  Hepatitis C  Testing is recommended for:  · Everyone born  from 1945 through 1965.  · Anyone with known risk factors for hepatitis C.  Sexually transmitted infections (STIs)  · Get screened for STIs, including gonorrhea and chlamydia, if:  ? You are sexually active and are younger than 24 years of age.  ? You are older than 24 years of age and your health care provider tells you that you are at risk for this type of infection.  ? Your sexual activity has changed since you were last screened, and you are at increased risk for chlamydia or gonorrhea. Ask your health care provider if you are at risk.  · Ask your health care provider about whether you are at high risk for HIV. Your health care provider may recommend a prescription medicine to help prevent HIV infection. If you choose to take medicine to prevent HIV, you should first get tested for HIV. You should then be tested every 3 months for as long as you are taking the medicine.  Pregnancy  · If you are about to stop having your period (premenopausal) and you may become pregnant, seek counseling before you get pregnant.  · Take 400 to 800 micrograms (mcg) of folic acid every day if you become pregnant.  · Ask for birth control (contraception) if you want to prevent pregnancy.  Osteoporosis and menopause  Osteoporosis is a disease in which the bones lose minerals and strength with aging. This can result in bone fractures. If you are 65 years old or older, or if you are at risk for osteoporosis and fractures, ask your health care provider if you should:  · Be screened for bone loss.  · Take a calcium or vitamin D supplement to lower your risk of fractures.  · Be given hormone replacement therapy (HRT) to treat symptoms of menopause.  Follow these instructions at home:  Lifestyle  · Do not use any products that contain nicotine or tobacco, such as cigarettes, e-cigarettes, and chewing tobacco. If you need help quitting, ask your health care provider.  · Do not use street drugs.  · Do not share needles.  · Ask your health  care provider for help if you need support or information about quitting drugs.  Alcohol use  · Do not drink alcohol if:  ? Your health care provider tells you not to drink.  ? You are pregnant, may be pregnant, or are planning to become pregnant.  · If you drink alcohol:  ? Limit how much you use to 0-1 drink a day.  ? Limit intake if you are breastfeeding.  · Be aware of how much alcohol is in your drink. In the [place] one drink equals one 12 oz bottle of beer (355 mL), one 5 oz glass of wine (148 mL), or one 1½ oz glass of hard liquor (44 mL).  General instructions  · Schedule regular health, dental, and eye exams.  · Stay current with your vaccines.  · Tell your health care provider if:  ? You often feel depressed.  ? You have ever been abused or do not feel safe at home.  Summary  · Adopting a healthy lifestyle and getting preventive care are important in promoting health and wellness.  · Follow your health care provider's instructions about healthy diet, exercising, and getting tested or screened for diseases.  · Follow your health care provider's instructions on monitoring your cholesterol and blood pressure.  This information is not intended to replace advice given to you by your health care provider. Make sure you discuss any questions you have with your health care provider.  Document Revised: 12/11/2019 Document Reviewed: 12/11/2019  [employer] Patient Education © 2021 [employer]    Immunization Schedule, 27-49 Years Old    Vaccines are usually given at various ages, according to a schedule. Your health care provider will recommend vaccines for you based on your age, medical history, and lifestyle or other factors, such as travel or where you work.  You may receive vaccines as individual doses or as more than one vaccine together in one shot (combination vaccines). Talk with your health care provider about the risks and benefits of combination vaccines.  Recommended immunizations for 27-49 years  old  Influenza vaccine  · You should get a dose of the influenza vaccine every year.  Tetanus, diphtheria, and pertussis vaccine  A vaccine that protects against tetanus, diphtheria, and pertussis is known as the Tdap vaccine. A vaccine that protects against tetanus and diphtheria is known as the Td vaccine.  · You should only get the Td vaccine if you have had at least 1 dose of the Tdap vaccine.  · You should get 1 dose of the Td or Tdap vaccine every 10 years, or you should get 1 dose of the Tdap vaccine if:  ? You have not previously gotten a Tdap vaccine.  ? You do not know if you have ever gotten a Tdap vaccine.  ? You are between 27 and 36 weeks pregnant.  Measles, mumps, and rubella vaccine  This is also known as the MMR vaccine. You may need to get the MMR vaccine if:  · You need to catch up on doses you missed in the past.  · You have not been given the vaccine before.  · You do not have evidence of immunity (by a blood test).  Pregnant women should not get the MMR vaccine during pregnancy because it may be harmful to the unborn baby. However, if you are not immune to measles, mumps, or rubella, you should get a dose of MMR vaccine one month or more before pregnancy or within days after delivery.  Varicella vaccine  This is also known as the DIANE vaccine. You may need to get the DIANE vaccine if you were born in 1980 or later and:  · You need to catch up on doses you missed in the past.  · You have not been given the vaccine before.  · You do not have evidence of immunity (by a blood test).  · You have certain high-risk conditions, such as HIV or AIDS.  Pregnant women should not get the DIANE vaccine during pregnancy because it may be harmful to the unborn baby. However, if you are not immune to chickenpox (varicella), you should get a dose of the DIANE vaccine within days after delivery.  Human papillomavirus vaccine  This is also known as the HPV vaccine. If you have not gotten the vaccine before or you missed  doses in the past, talk to your health care provider about whether it is appropriate for you to get the HPV vaccine.  Pneumococcal conjugate vaccine  This is also known as the PCV13 vaccine. You should get the PCV13 vaccine as recommended if you have certain high-risk conditions. These include:  · Diabetes.  · Chronic conditions of the heart, lungs, or liver.  · Conditions that affect the body's disease-fighting system (immune system).  Pneumococcal polysaccharide vaccine  This is also known as the PPSV23 vaccine. You should get the PPSV23 vaccine as recommended if you have certain high-risk conditions. These include:  · Diabetes.  · Chronic conditions of the heart, lungs, or liver.  · Conditions that affect the immune system.  Hepatitis A vaccine  This is also known as the HepA vaccine. If you did not get the HepA vaccine previously, you should get it if:  · You are at risk for a hepatitis A infection. You may be at risk for infection if you:  ? Have chronic liver disease.  ? Have HIV or AIDS.  ? Are a man who has sex with men.  ? Use drugs.  ? Are homeless.  ? May be exposed to hepatitis A through work.  ? Travel to countries where hepatitis A is common.  ? Are pregnant.  ? Have or will have close contact with someone who was adopted from another country.  · You are not at risk for infection but want protection from hepatitis A.  Hepatitis B vaccine  This is also known as the HepB vaccine. If you did not get the HepB vaccine previously, you should get it if:  · You are at risk for hepatitis B infection. You are at risk if you:  ? Have chronic liver disease.  ? Have HIV or AIDS.  ? Have sex with a partner who has hepatitis B, or:  § You have multiple sex partners.  § You are a man who has sex with men.  ? Use drugs.  ? May be exposed to hepatitis B through work.  ? Live with someone who has hepatitis B.  ? Receive dialysis treatment.  ? Have diabetes.  ? Travel to countries where hepatitis B is common.  ? Are  pregnant.  · You are not at risk of infection but want protection from hepatitis B.  Meningococcal conjugate vaccine  This is also known as the MenACWY vaccine. You may need to get the MenACWY vaccine if you:  · Have not been given the vaccine before.  · Need to catch up on doses you missed in the past.  This vaccine is especially important if you:  · Do not have a spleen.  · Have sickle cell disease.  · Have HIV.  · Take medicines that suppress your immune system.  · Travel to countries where meningococcal disease is common.  · Are exposed to Neisseria meningitidis at work.  Serogroup B meningococcal vaccine  This is also known as the MenB vaccine. You may need to get the MenB vaccine if you:  · Have not been given the vaccine before.  · Need to catch up on doses you missed in the past.  This vaccine is especially important if you:  · Do not have a spleen.  · Have sickle cell disease.  · Take medicines that suppress your immune system.  · Are exposed to Neisseria meningitidis at work.  Haemophilus influenzae type b vaccine  This is also known as the Hib vaccine. Anyone older than 5 years of age is usually not given the Hib vaccine. However, if you have certain high-risk conditions, you may need to get this vaccine. These conditions include:  · Not having a spleen.  · Having received a stem cell transplant.  Before you get a vaccine:  Talk with your health care provider about which vaccines are right for you. This is especially important if:  · You previously had a reaction after getting a vaccine.  · You have a weakened immune system. You may have a weakened immune system if you:  ? Are taking medicines that reduce (suppress) the activity of your immune system.  ? Are taking medicines to treat cancer (chemotherapy).  ? Have HIV or AIDS.  · You work in an environment where you may be exposed to a disease.  · You plan to travel outside of the country.  · You have a chronic illness, such as heart disease, kidney  disease, diabetes, or lung disease.  · You are pregnant, think you may be pregnant, or are planning to become pregnant.  Summary  · Before you get a vaccine, tell your health care provider if you have reacted to vaccines in the past or have a condition that weakens your immune system.  · At 27-49 years, you should get a dose of the influenza vaccine every year and a dose of the Td or Tdap vaccine every 10 years.  · Depending on your medical history and your risk factors, you may need other vaccines. Ask your health care provider whether you are up to date on all your vaccines.  · Women who are pregnant may not receive certain vaccines. Ask your health care provider whether you should receive any vaccines soon after you deliver your baby.  This information is not intended to replace advice given to you by your health care provider. Make sure you discuss any questions you have with your health care provider.  Document Revised: 10/14/2020 Document Reviewed: 10/14/2020  [employer] Patient Education © 2021 [employer]    Medical Screening Exam    A medical screening exam helps determine whether or not you need immediate medical treatment. This type of exam may be done in the emergency department, an urgent care setting, or your health care provider's office.  During the exam, a health care provider does a short physical exam and asks about your medical history to assess:  · Your current symptoms.  · Your overall health.  Depending on your symptoms, you may need additional tests.  What are the possible outcomes of a medical screening exam?  Your medical screening exam may determine that:  · You do not need emergency treatment at this time.  · You need treatment right away.  · You need to be transferred to another medical center.  · You need to have more tests.  A medical specialist may be consulted if necessary.  When should I seek medical care?  If you have a regular health care provider, make an appointment for a  follow-up visit with him or her. If you do not have a regular health care provider, ask about resources in your community.  Get help right away if:  · Your condition gets worse or you develop new or troubling symptoms before you see your health care provider. If this occurs, go to an emergency department right away.  In an emergency:  · Call 911 or have someone drive you to the nearest hospital.  · Do not drive yourself.  Summary  · A medical screening exam helps determine whether or not you need immediate medical treatment.  · During the exam, a health care provider does a short physical exam and asks about your current symptoms and overall health.  · More tests may be ordered during the exam.  · You may need to be transferred to another medical center.  This information is not intended to replace advice given to you by your health care provider. Make sure you discuss any questions you have with your health care provider.  Document Revised: 04/10/2020 Document Reviewed: 07/16/2019  Elsevier Patient Education © 2021 [employer]

## 2021-10-09 LAB
25(OH)D3+25(OH)D2 SERPL-MCNC: 38.4 NG/ML (ref 30–100)
ALBUMIN SERPL-MCNC: 4.8 G/DL (ref 3.5–5.2)
ALBUMIN/GLOB SERPL: 1.7 G/DL
ALP SERPL-CCNC: 69 U/L (ref 39–117)
ALT SERPL-CCNC: 11 U/L (ref 1–33)
AST SERPL-CCNC: 13 U/L (ref 1–32)
BILIRUB SERPL-MCNC: 0.4 MG/DL (ref 0–1.2)
BUN SERPL-MCNC: 9 MG/DL (ref 6–20)
BUN/CREAT SERPL: 8.7 (ref 7–25)
CALCIUM SERPL-MCNC: 10 MG/DL (ref 8.6–10.5)
CHLORIDE SERPL-SCNC: 100 MMOL/L (ref 98–107)
CHOLEST SERPL-MCNC: 168 MG/DL (ref 0–200)
CO2 SERPL-SCNC: 27.1 MMOL/L (ref 22–29)
CREAT SERPL-MCNC: 1.03 MG/DL (ref 0.57–1)
ERYTHROCYTE [DISTWIDTH] IN BLOOD BY AUTOMATED COUNT: 12.8 % (ref 12.3–15.4)
GLOBULIN SER CALC-MCNC: 2.9 GM/DL
GLUCOSE SERPL-MCNC: 91 MG/DL (ref 65–99)
HCT VFR BLD AUTO: 44.8 % (ref 34–46.6)
HCV AB S/CO SERPL IA: <0.1 S/CO RATIO (ref 0–0.9)
HDLC SERPL-MCNC: 51 MG/DL (ref 40–60)
HGB BLD-MCNC: 14.3 G/DL (ref 12–15.9)
LDLC SERPL CALC-MCNC: 106 MG/DL (ref 0–100)
MCH RBC QN AUTO: 29.8 PG (ref 26.6–33)
MCHC RBC AUTO-ENTMCNC: 31.9 G/DL (ref 31.5–35.7)
MCV RBC AUTO: 93.3 FL (ref 79–97)
PLATELET # BLD AUTO: 388 10*3/MM3 (ref 140–450)
POTASSIUM SERPL-SCNC: 4.8 MMOL/L (ref 3.5–5.2)
PROT SERPL-MCNC: 7.7 G/DL (ref 6–8.5)
RBC # BLD AUTO: 4.8 10*6/MM3 (ref 3.77–5.28)
SODIUM SERPL-SCNC: 139 MMOL/L (ref 136–145)
TRIGL SERPL-MCNC: 58 MG/DL (ref 0–150)
VLDLC SERPL CALC-MCNC: 11 MG/DL (ref 5–40)
WBC # BLD AUTO: 5.63 10*3/MM3 (ref 3.4–10.8)

## 2021-10-11 DIAGNOSIS — E78.2 MIXED HYPERLIPIDEMIA: Primary | ICD-10-CM

## 2021-10-11 NOTE — PROGRESS NOTES
Please notify patient:    CBC is WNL.     CMP is stable.     Hep C screening is neg.     Vitamin D level is wnl.     Lipid panel-- LDL slightly elevated. Patient should continue on healthy heart diet and exercise. Would like patient to return in 3 months for lab recheck.

## 2021-11-04 ENCOUNTER — OFFICE VISIT (OUTPATIENT)
Dept: INTERNAL MEDICINE | Facility: CLINIC | Age: 46
End: 2021-11-04

## 2021-11-04 VITALS
BODY MASS INDEX: 27.89 KG/M2 | SYSTOLIC BLOOD PRESSURE: 120 MMHG | OXYGEN SATURATION: 99 % | RESPIRATION RATE: 16 BRPM | HEART RATE: 76 BPM | DIASTOLIC BLOOD PRESSURE: 72 MMHG | HEIGHT: 63 IN | TEMPERATURE: 97.8 F | WEIGHT: 157.4 LBS

## 2021-11-04 DIAGNOSIS — Z01.419 ENCOUNTER FOR CERVICAL PAP SMEAR WITH PELVIC EXAM: Primary | ICD-10-CM

## 2021-11-04 PROCEDURE — 99213 OFFICE O/P EST LOW 20 MIN: CPT | Performed by: NURSE PRACTITIONER

## 2021-11-04 RX ORDER — LAMOTRIGINE 200 MG/1
TABLET ORAL
COMMUNITY
Start: 2021-10-19

## 2021-11-04 NOTE — ASSESSMENT & PLAN NOTE
Pap smear completed today.   Mammogram scheduled 11/12/21.  Patient should continue with exercise 150 minutes weekly.   Patient should continue with safe sex practices.

## 2021-11-04 NOTE — PROGRESS NOTES
"Chief Complaint  Pap Smear/Women's Exam    Subjective          Romana Beavers presents to [facility]  History of Present Illness  This is a 45 y/o female presenting to office for women's exam/Pap smear. Patient currently  and lives with /step children.     Patient does not recall when last PAP smear was completed. Patient currently not on birth control. Patient currently does not have biological children. Patient denies any concern for STD exposure.     Patient is currently scheduled for mammogram next Friday.     Objective   Vital Signs:   /72 (BP Location: Right arm, Patient Position: Sitting, Cuff Size: Adult)   Pulse 76   Temp 97.8 °F (36.6 °C) (Temporal)   Resp 16   Ht 160 cm (63\")   Wt 71.4 kg (157 lb 6.4 oz)   SpO2 99%   BMI 27.88 kg/m²     Physical Exam  Constitutional:       Appearance: Normal appearance. She is normal weight.   HENT:      Head: Normocephalic and atraumatic.   Eyes:      Extraocular Movements: Extraocular movements intact.      Conjunctiva/sclera: Conjunctivae normal.      Pupils: Pupils are equal, round, and reactive to light.   Pulmonary:      Effort: Pulmonary effort is normal.   Abdominal:      General: There is no distension.      Tenderness: There is no abdominal tenderness.      Hernia: There is no hernia in the left inguinal area or right inguinal area.   Genitourinary:     General: Normal vulva.      Pubic Area: No rash.       Labia:         Right: No rash, tenderness, lesion or injury.         Left: No rash, tenderness, lesion or injury.       Urethra: No prolapse, urethral pain, urethral swelling or urethral lesion.      Vagina: Normal.      Cervix: No cervical motion tenderness, friability, erythema or eversion.      Uterus: Normal. Not enlarged, not tender and no uterine prolapse.       Rectum: External hemorrhoid present.   Lymphadenopathy:      Lower Body: No right inguinal adenopathy. No left inguinal adenopathy. "   Skin:     General: Skin is warm and dry.      Capillary Refill: Capillary refill takes less than 2 seconds.   Neurological:      General: No focal deficit present.      Mental Status: She is alert and oriented to person, place, and time. Mental status is at baseline.   Psychiatric:         Mood and Affect: Mood normal.         Behavior: Behavior normal.         Thought Content: Thought content normal.         Judgment: Judgment normal.        Result Review :   The following data was reviewed by: SRINIVAS Jacobs on 11/04/2021:  Common labs    Common Labsle 10/8/21 10/8/21 10/8/21    0937 0937 0937   Glucose  91    BUN  9    Creatinine  1.03 (A)    eGFR Non  Am  58 (A)    eGFR African Am  71    Sodium  139    Potassium  4.8    Chloride  100    Calcium  10.0    Total Protein  7.7    Albumin  4.80    Total Bilirubin  0.4    Alkaline Phosphatase  69    AST (SGOT)  13    ALT (SGPT)  11    WBC 5.63     Hemoglobin 14.3     Hematocrit 44.8     Platelets 388     Total Cholesterol   168   Triglycerides   58   HDL Cholesterol   51   LDL Cholesterol    106 (A)   (A) Abnormal value       Comments are available for some flowsheets but are not being displayed.                  Assessment and Plan    Diagnoses and all orders for this visit:    1. Encounter for cervical Pap smear with pelvic exam (Primary)  Assessment & Plan:  Pap smear completed today.   Mammogram scheduled 11/12/21.  Patient should continue with exercise 150 minutes weekly.   Patient should continue with safe sex practices.       Orders:  -     IGP, Aptima HPV, Rfx 16 / 18,45    I spent 20 minutes caring for Romana on this date of service. This time includes time spent by me in the following activities:preparing for the visit, reviewing tests, obtaining and/or reviewing a separately obtained history, performing a medically appropriate examination and/or evaluation , counseling and educating the patient/family/caregiver, ordering medications, tests, or  procedures, documenting information in the medical record and care coordination  Follow Up   Return in about 3 months (around 2/4/2022) for Recheck HLD/HTN.  Patient was given instructions and counseling regarding her condition or for health maintenance advice. Please see specific information pulled into the AVS if appropriate.

## 2021-11-10 LAB
CYTOLOGIST CVX/VAG CYTO: NORMAL
CYTOLOGY CVX/VAG DOC CYTO: NORMAL
CYTOLOGY CVX/VAG DOC THIN PREP: NORMAL
DX ICD CODE: NORMAL
HIV 1 & 2 AB SER-IMP: NORMAL
HPV I/H RISK 4 DNA CVX QL PROBE+SIG AMP: NEGATIVE
OTHER STN SPEC: NORMAL
REF LAB TEST METHOD: NORMAL
STAT OF ADQ CVX/VAG CYTO-IMP: NORMAL

## 2021-11-12 ENCOUNTER — HOSPITAL ENCOUNTER (OUTPATIENT)
Dept: ULTRASOUND IMAGING | Facility: HOSPITAL | Age: 46
Discharge: HOME | End: 2021-11-12

## 2021-11-12 ENCOUNTER — HOSPITAL ENCOUNTER (OUTPATIENT)
Dept: MAMMOGRAPHY | Facility: HOSPITAL | Age: 46
Discharge: HOME | End: 2021-11-12

## 2021-11-12 DIAGNOSIS — N63.20 LEFT BREAST MASS: ICD-10-CM

## 2021-11-12 PROCEDURE — 76642 ULTRASOUND BREAST LIMITED: CPT

## 2021-11-12 PROCEDURE — 77066 DX MAMMO INCL CAD BI: CPT

## 2021-11-12 PROCEDURE — G0279 TOMOSYNTHESIS, MAMMO: HCPCS

## 2021-11-16 DIAGNOSIS — N63.20 LEFT BREAST MASS: Primary | ICD-10-CM

## 2021-11-16 NOTE — PROGRESS NOTES
Please notify patient:    Patient does have a 0.9 cm benign mass in her left breast-- which is recommended for 6 month ultrasound follow up. I will schedule this for her.     Patient also noted to have benign cysts in left breast.     They did recommend the f/u ultrasound in six months so we will proceed with that, but at this time it looks most likely benign per the ultrasound reading.

## 2022-01-14 ENCOUNTER — OFFICE VISIT (OUTPATIENT)
Dept: INTERNAL MEDICINE | Facility: CLINIC | Age: 47
End: 2022-01-14

## 2022-01-14 VITALS
HEIGHT: 63 IN | HEART RATE: 80 BPM | BODY MASS INDEX: 28.17 KG/M2 | OXYGEN SATURATION: 98 % | DIASTOLIC BLOOD PRESSURE: 62 MMHG | SYSTOLIC BLOOD PRESSURE: 124 MMHG | TEMPERATURE: 97.1 F | WEIGHT: 159 LBS

## 2022-01-14 DIAGNOSIS — R11.0 NAUSEA: ICD-10-CM

## 2022-01-14 DIAGNOSIS — J30.2 SEASONAL ALLERGIES: Primary | ICD-10-CM

## 2022-01-14 DIAGNOSIS — M54.50 LUMBOSACRAL PAIN: ICD-10-CM

## 2022-01-14 PROCEDURE — 99214 OFFICE O/P EST MOD 30 MIN: CPT | Performed by: NURSE PRACTITIONER

## 2022-01-14 RX ORDER — IBUPROFEN 600 MG/1
600 TABLET ORAL EVERY 6 HOURS PRN
Qty: 40 TABLET | Refills: 0 | Status: SHIPPED | OUTPATIENT
Start: 2022-01-14 | End: 2022-01-24

## 2022-01-14 RX ORDER — ONDANSETRON 4 MG/1
4 TABLET, FILM COATED ORAL EVERY 8 HOURS PRN
Qty: 30 TABLET | Refills: 0 | Status: SHIPPED | OUTPATIENT
Start: 2022-01-14 | End: 2022-01-24

## 2022-01-14 RX ORDER — CYCLOBENZAPRINE HCL 10 MG
10 TABLET ORAL 3 TIMES DAILY PRN
Qty: 30 TABLET | Refills: 0 | Status: SHIPPED | OUTPATIENT
Start: 2022-01-14 | End: 2022-02-21 | Stop reason: SDUPTHER

## 2022-01-14 NOTE — PROGRESS NOTES
"Chief Complaint  Back Pain and Nausea    Subjective          Romana Beavers presents to [facility]  History of Present Illness   This is a 45 y/o female presenting to office with complaints of nausea and lower back pain.     Patient reports nausea over the past two weeks. Patient reports this occurs in the morning. Patient reports she believes this may be related to sinus drainage. Patient reports increased sinus drainage over the past 2 weeks. Patient reports she does not take any allergy medication. Patient reports not using any nasal sprays.     Patient reports lumbosacral pain. Patient reports no occurrence of trauma. 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 Patient reports it improves with laying down. Patient reports using heating pad as well. Patient reports aleve but reports this doesn't really improve pain.     Objective   Vital Signs:   /62   Pulse 80   Temp 97.1 °F (36.2 °C) (Temporal)   Ht 160 cm (62.99\")   Wt 72.1 kg (159 lb)   SpO2 98%   BMI 28.17 kg/m²     Physical Exam  Constitutional:       Appearance: Normal appearance. She is normal weight.   HENT:      Head: Normocephalic and atraumatic.      Nose: Rhinorrhea present.   Eyes:      Extraocular Movements: Extraocular movements intact.      Conjunctiva/sclera: Conjunctivae normal.      Pupils: Pupils are equal, round, and reactive to light.   Cardiovascular:      Rate and Rhythm: Normal rate and regular rhythm.      Pulses: Normal pulses.      Heart sounds: Normal heart sounds. No murmur heard.  No friction rub. No gallop.    Pulmonary:      Effort: Pulmonary effort is normal. No respiratory distress.      Breath sounds: Normal breath sounds. No stridor. No wheezing, rhonchi or rales.   Abdominal:      General: There is no distension.      Tenderness: There is no abdominal tenderness.   Musculoskeletal:      Lumbar back: Spasms and " tenderness present. Decreased range of motion.        Back:    Skin:     Capillary Refill: Capillary refill takes less than 2 seconds.   Neurological:      General: No focal deficit present.      Mental Status: She is alert and oriented to person, place, and time. Mental status is at baseline.      Motor: No weakness.   Psychiatric:         Mood and Affect: Mood normal.         Behavior: Behavior normal.         Thought Content: Thought content normal.         Judgment: Judgment normal.        Result Review :                 Assessment and Plan    Diagnoses and all orders for this visit:    1. Seasonal allergies (Primary)  Assessment & Plan:  Flonase daily.   Nasal saline Q1H PRN for nasal congestion.   Xyzal recommended.       2. Nausea  Assessment & Plan:  Zofran PRN.   Continue with nasal rinses.     Orders:  -     ondansetron (Zofran) 4 MG tablet; Take 1 tablet by mouth Every 8 (Eight) Hours As Needed for Nausea or Vomiting for up to 10 days.  Dispense: 30 tablet; Refill: 0    3. Lumbosacral pain  Assessment & Plan:  MRI ordered.   Continue with heat treatment as needed.   Ibuprofen 600mg every six hours as needed for pain.   Flexeril 10mg every eight hours as needed for spasms.   Continue with activity and stretching as tolerated.   Encouraged to use standing desk or other modalities to help with pain.     Orders:  -     MRI Lumbar Spine Without Contrast; Future  -     Diclofenac Sodium (VOLTAREN) 1 % gel gel; Apply 4 g topically to the appropriate area as directed 4 (Four) Times a Day As Needed (pain).  Dispense: 150 g; Refill: 1  -     ibuprofen (ADVIL,MOTRIN) 600 MG tablet; Take 1 tablet by mouth Every 6 (Six) Hours As Needed for Mild Pain  or Moderate Pain  for up to 10 days.  Dispense: 40 tablet; Refill: 0  -     cyclobenzaprine (FLEXERIL) 10 MG tablet; Take 1 tablet by mouth 3 (Three) Times a Day As Needed for Muscle Spasms.  Dispense: 30 tablet; Refill: 0    I spent 30 minutes caring for Romana on this  date of service. This time includes time spent by me in the following activities:preparing for the visit, reviewing tests, obtaining and/or reviewing a separately obtained history, performing a medically appropriate examination and/or evaluation , counseling and educating the patient/family/caregiver, ordering medications, tests, or procedures, documenting information in the medical record and care coordination  Follow Up   Return if symptoms worsen or fail to improve.  Patient was given instructions and counseling regarding her condition or for health maintenance advice. Please see specific information pulled into the AVS if appropriate.

## 2022-01-14 NOTE — ASSESSMENT & PLAN NOTE
MRI ordered.   Continue with heat treatment as needed.   Ibuprofen 600mg every six hours as needed for pain.   Flexeril 10mg every eight hours as needed for spasms.   Continue with activity and stretching as tolerated.   Encouraged to use standing desk or other modalities to help with pain.

## 2022-02-07 ENCOUNTER — HOSPITAL ENCOUNTER (OUTPATIENT)
Dept: MRI IMAGING | Facility: HOSPITAL | Age: 47
Discharge: HOME | End: 2022-02-07
Admitting: NURSE PRACTITIONER

## 2022-02-07 DIAGNOSIS — M54.50 LUMBOSACRAL PAIN: ICD-10-CM

## 2022-02-07 PROCEDURE — 72148 MRI LUMBAR SPINE W/O DYE: CPT

## 2022-02-08 DIAGNOSIS — M54.50 LUMBOSACRAL PAIN: Primary | ICD-10-CM

## 2022-02-08 NOTE — PROGRESS NOTES
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 I am going to refer to neurosurgery for further evaluation and recommendations.

## 2022-02-21 ENCOUNTER — OFFICE VISIT (OUTPATIENT)
Dept: INTERNAL MEDICINE | Facility: CLINIC | Age: 47
End: 2022-02-21

## 2022-02-21 VITALS
WEIGHT: 160 LBS | DIASTOLIC BLOOD PRESSURE: 80 MMHG | SYSTOLIC BLOOD PRESSURE: 118 MMHG | HEART RATE: 67 BPM | BODY MASS INDEX: 28.35 KG/M2 | OXYGEN SATURATION: 96 % | TEMPERATURE: 97.7 F | HEIGHT: 63 IN

## 2022-02-21 DIAGNOSIS — M54.50 LUMBOSACRAL PAIN: Primary | ICD-10-CM

## 2022-02-21 DIAGNOSIS — E78.2 MIXED HYPERLIPIDEMIA: ICD-10-CM

## 2022-02-21 PROCEDURE — 99213 OFFICE O/P EST LOW 20 MIN: CPT | Performed by: NURSE PRACTITIONER

## 2022-02-21 RX ORDER — CYCLOBENZAPRINE HCL 10 MG
10 TABLET ORAL 3 TIMES DAILY PRN
Qty: 30 TABLET | Refills: 2 | Status: SHIPPED | OUTPATIENT
Start: 2022-02-21 | End: 2022-09-19

## 2022-02-21 NOTE — ASSESSMENT & PLAN NOTE
Continue with physical therapy and exercise.   Flexeril PRN.   F/u with Dr. Reid as scheduled-- currently on wait list for cancellations; along with scheduled appointment in August.

## 2022-02-21 NOTE — PROGRESS NOTES
"Chief Complaint  Back Pain (Pt has had ongoing back pain for three months.)    Subjective          Romana Beavers presents to [facility]  History of Present Illness  This is a 47 y/o female presenting to office with follow up for back pain. Patient reports back pain is improving and has been working with physical therapy. Patient reports she has appointment with Dr. Cerda in August. Patient also using stand desk and has been using flexeril as needed which has helped with managing the pain as well. Patient denies any loss of B&B. Patient reports she is improving.     Patient is augmenting diet to help with elevated LDL level. Agreeable to return for recheck in six months. Patient continues with exercise.     Objective   Vital Signs:   /80   Pulse 67   Temp 97.7 °F (36.5 °C) (Temporal)   Ht 160 cm (62.99\")   Wt 72.6 kg (160 lb)   SpO2 96%   BMI 28.35 kg/m²     Physical Exam  Vitals and nursing note reviewed.   Constitutional:       Appearance: Normal appearance. She is normal weight.   HENT:      Head: Normocephalic and atraumatic.   Eyes:      Extraocular Movements: Extraocular movements intact.      Conjunctiva/sclera: Conjunctivae normal.      Pupils: Pupils are equal, round, and reactive to light.      Comments: +glasses   Cardiovascular:      Rate and Rhythm: Normal rate and regular rhythm.      Pulses: Normal pulses.      Heart sounds: Normal heart sounds. No murmur heard.  No friction rub. No gallop.    Pulmonary:      Effort: Pulmonary effort is normal. No respiratory distress.      Breath sounds: Normal breath sounds. No stridor. No wheezing, rhonchi or rales.   Abdominal:      General: There is no distension.      Palpations: Abdomen is soft.      Tenderness: There is no abdominal tenderness.   Musculoskeletal:         General: Tenderness present. No swelling. Normal range of motion.      Cervical back: Normal range of motion and neck supple.      Lumbar back: " Spasms present.   Skin:     General: Skin is warm and dry.      Capillary Refill: Capillary refill takes less than 2 seconds.      Coloration: Skin is not jaundiced or pale.   Neurological:      General: No focal deficit present.      Mental Status: She is alert and oriented to person, place, and time. Mental status is at baseline.   Psychiatric:         Mood and Affect: Mood normal.         Behavior: Behavior normal.         Thought Content: Thought content normal.         Judgment: Judgment normal.        Result Review :   The following data was reviewed by: SRINIVAS Jacobs on 02/21/2022:  Common labs    Common Labsle 10/8/21 10/8/21 10/8/21 2/14/22    0937 0937 0937    Glucose  91     BUN  9     Creatinine  1.03 (A)     eGFR Non  Am  58 (A)     eGFR African Am  71     Sodium  139     Potassium  4.8     Chloride  100     Calcium  10.0     Total Protein  7.7     Albumin  4.80     Total Bilirubin  0.4     Alkaline Phosphatase  69     AST (SGOT)  13     ALT (SGPT)  11     WBC 5.63      Hemoglobin 14.3      Hematocrit 44.8      Platelets 388      Total Cholesterol   168 193   Triglycerides   58 52   HDL Cholesterol   51 62   LDL Cholesterol    106 (A) 121 (A)   (A) Abnormal value       Comments are available for some flowsheets but are not being displayed.               Reviewed:  MRI Lumbar Spine Without Contrast (02/07/2022 9:05 PM)       Assessment and Plan    Diagnoses and all orders for this visit:    1. Lumbosacral pain (Primary)  Assessment & Plan:  Continue with physical therapy and exercise.   Flexeril PRN.   F/u with Dr. Reid as scheduled-- currently on wait list for cancellations; along with scheduled appointment in August.     Orders:  -     cyclobenzaprine (FLEXERIL) 10 MG tablet; Take 1 tablet by mouth 3 (Three) Times a Day As Needed for Muscle Spasms.  Dispense: 30 tablet; Refill: 2    2. Mixed hyperlipidemia  Assessment & Plan:  Lipid abnormalities are unchanged.  Nutritional  counseling was provided.  Lipids will be reassessed in 6 months.        Follow Up   Return in about 6 months (around 8/21/2022) for Recheck HLD.  Patient was given instructions and counseling regarding her condition or for health maintenance advice. Please see specific information pulled into the AVS if appropriate.

## 2022-05-13 ENCOUNTER — HOSPITAL ENCOUNTER (OUTPATIENT)
Dept: ULTRASOUND IMAGING | Facility: HOSPITAL | Age: 47
Discharge: HOME | End: 2022-05-13

## 2022-05-13 ENCOUNTER — HOSPITAL ENCOUNTER (OUTPATIENT)
Dept: MAMMOGRAPHY | Facility: HOSPITAL | Age: 47
Discharge: HOME | End: 2022-05-13

## 2022-05-13 DIAGNOSIS — N63.20 LEFT BREAST MASS: ICD-10-CM

## 2022-05-13 DIAGNOSIS — N63.20 MASS OF LEFT BREAST, UNSPECIFIED QUADRANT: Primary | ICD-10-CM

## 2022-05-13 PROCEDURE — 76642 ULTRASOUND BREAST LIMITED: CPT

## 2022-08-01 ENCOUNTER — TELEPHONE (OUTPATIENT)
Dept: NEUROSURGERY | Facility: CLINIC | Age: 47
End: 2022-08-01

## 2022-08-01 NOTE — TELEPHONE ENCOUNTER
SPOKE WITH PT, TO LET HER KNOW THAT WE NEED TO RESCHEDULE DUE TO  BEING CALLED INTO THE OR.    I LET HER KNOW THAT WE WILL RESCHEDULE AS SOON AS POSSIBLE

## 2022-08-05 NOTE — PROGRESS NOTES
Subjective   Patient ID: Romana Beavers is a 47 y.o. female is being seen for consultation today at the request of SRINIVAS Jacobs for lumbosacral pain.  MRI lumbar done on 2/7/22.    Today Ms. Beavers reports she is having constant pain in her lower back radiating to her left hip. She denies any leg pain. She denies any numbness or tingling. She denies any leg weakness. She denies any bowel or bladder incontinence. She reports she has been taking flexeril or ibuprofen/ aleve.  She reports she has been doing physical therapy.     Is very nice lady has had a history of chronic low back pain for years but got worse about 9 months ago.  She was not doing anything specific but it is mainly on the left side of her low back.  Movement tends to aggravate it.  She does not describe any sciatica.  She had previous history of left-sided neck pain about 5 or 6 years ago which did get better with time and physical therapy.  She had an epidural block in the neck and it did not help and was very unpleasant.  She continues to work and has a job involving constant computer work and she has a desk that alternates between sitting and standing.  She has been working with her physical therapist and seems to be making progress but still continues to hurt.  But she feels functional.  I talked about her MRI and her symptoms are probably coming mostly from the L4-L5 level where she has some disc protrusion towards the left and some facet disease.  She certainly does not need any surgery and I urged her to continue working with her therapist.  I suggested she look into the doing Pilates.  But if the exercise-based approach does not help then perhaps she would benefit from a radiofrequency ablation.  I reassured her that nothing surgical needs to be done.  I would not do an epidural blocks and she does not have sciatica.    Back Pain  This is a chronic problem. The current episode started more than 1 month ago. The problem occurs  constantly. The problem is unchanged. The pain is present in the lumbar spine. The quality of the pain is described as aching and shooting. The pain does not radiate. The pain is at a severity of 5/10. The pain is moderate. The symptoms are aggravated by position, bending, lying down, sitting, standing and twisting. Stiffness is present at night. Pertinent negatives include no bladder incontinence, bowel incontinence, chest pain, leg pain, numbness, tingling or weakness. She has tried analgesics and muscle relaxant for the symptoms. The treatment provided no relief.       The following portions of the patient's history were reviewed and updated as appropriate: allergies, current medications, past family history, past medical history, past social history, past surgical history and problem list.    Review of Systems   Constitutional: Negative for activity change.   HENT: Positive for congestion.    Eyes: Negative for visual disturbance.   Respiratory: Negative for chest tightness and shortness of breath.    Cardiovascular: Negative for chest pain.   Gastrointestinal: Negative for bowel incontinence, nausea and vomiting.   Endocrine: Negative for cold intolerance and heat intolerance.   Genitourinary: Negative for bladder incontinence and difficulty urinating.   Musculoskeletal: Positive for back pain.   Skin: Negative for rash and wound.   Allergic/Immunologic: Positive for environmental allergies.   Neurological: Negative for tingling, weakness and numbness.   Hematological: Does not bruise/bleed easily.   Psychiatric/Behavioral: Positive for sleep disturbance.       Objective   Physical Exam  Neurologic Exam    Assessment & Plan   Independent Review of Radiographic Studies:      The lumbar MRI done in February 2022 shows some disc degeneration at L4-L5 with desiccation and little bit of disc bulging towards the left and facet disease at L4-L5 and L5-S1.  Agree with the report.      Medical Decision Making:      I  asked her to continue working with her therapist and perhaps try some Pilates.  I asked her to come back in 5 months.  If she is not any better then we can send her to pain management for medial branch blocks and the possible radiofrequency ablation.  If she develops sciatica she will let us know.  She knows that fundamentally she is dealing with a noncurable nonreversible process that can nevertheless be treated and keep her functional and her quality of life reasonable.      Diagnoses and all orders for this visit:    1. DDD (degenerative disc disease), lumbar (Primary)    2. Herniated lumbar intervertebral disc    3. Facet arthritis of lumbar region      Return in about 5 months (around 1/10/2023) for Face-to-face.

## 2022-08-10 ENCOUNTER — OFFICE VISIT (OUTPATIENT)
Dept: NEUROSURGERY | Facility: CLINIC | Age: 47
End: 2022-08-10

## 2022-08-10 VITALS
BODY MASS INDEX: 28.35 KG/M2 | WEIGHT: 160 LBS | HEIGHT: 63 IN | DIASTOLIC BLOOD PRESSURE: 72 MMHG | OXYGEN SATURATION: 99 % | HEART RATE: 95 BPM | SYSTOLIC BLOOD PRESSURE: 108 MMHG

## 2022-08-10 DIAGNOSIS — M51.26 HERNIATED LUMBAR INTERVERTEBRAL DISC: ICD-10-CM

## 2022-08-10 DIAGNOSIS — M47.816 FACET ARTHRITIS OF LUMBAR REGION: ICD-10-CM

## 2022-08-10 DIAGNOSIS — M51.36 DDD (DEGENERATIVE DISC DISEASE), LUMBAR: Primary | ICD-10-CM

## 2022-08-10 PROBLEM — M51.369 DDD (DEGENERATIVE DISC DISEASE), LUMBAR: Status: ACTIVE | Noted: 2022-08-10

## 2022-08-10 PROCEDURE — 99204 OFFICE O/P NEW MOD 45 MIN: CPT | Performed by: NEUROLOGICAL SURGERY

## 2022-09-02 ENCOUNTER — OFFICE VISIT (OUTPATIENT)
Dept: INTERNAL MEDICINE | Facility: CLINIC | Age: 47
End: 2022-09-02

## 2022-09-02 VITALS
DIASTOLIC BLOOD PRESSURE: 78 MMHG | OXYGEN SATURATION: 97 % | SYSTOLIC BLOOD PRESSURE: 116 MMHG | TEMPERATURE: 97.5 F | HEIGHT: 63 IN | WEIGHT: 162.8 LBS | HEART RATE: 78 BPM | BODY MASS INDEX: 28.84 KG/M2

## 2022-09-02 DIAGNOSIS — Z00.00 ROUTINE ADULT HEALTH MAINTENANCE: ICD-10-CM

## 2022-09-02 DIAGNOSIS — R92.8 ABNORMALITY OF LEFT BREAST ON SCREENING MAMMOGRAM: ICD-10-CM

## 2022-09-02 DIAGNOSIS — F31.78 BIPOLAR DISORDER, IN FULL REMISSION, MOST RECENT EPISODE MIXED: ICD-10-CM

## 2022-09-02 DIAGNOSIS — L98.9 SKIN LESION: ICD-10-CM

## 2022-09-02 DIAGNOSIS — M51.36 DDD (DEGENERATIVE DISC DISEASE), LUMBAR: Primary | ICD-10-CM

## 2022-09-02 PROBLEM — F31.9 BIPOLAR DISORDER: Status: ACTIVE | Noted: 2022-09-02

## 2022-09-02 PROCEDURE — 99214 OFFICE O/P EST MOD 30 MIN: CPT | Performed by: NURSE PRACTITIONER

## 2022-09-02 RX ORDER — QUETIAPINE FUMARATE 25 MG/1
TABLET, FILM COATED ORAL
COMMUNITY
Start: 2022-08-13

## 2022-09-02 NOTE — PROGRESS NOTES
"Chief Complaint  Follow-up (Follow up from )    Subjective        Romana Beavers presents to [facility]  History of Present Illness  This is a 48 y/o female presenting to office for f/u with DDD. Patient had seen Dr. Cerda back on 8/10/22. Patient is continuing with physical therapy and recommended to f/u with Dr. Cerda in 5 months for possible nerve ablation.     Patient reports area on right temple/forehead region near hair line-- patient reports this has been there for a year. Patient reports that it has grown in size and it is now scabbing and crusting.     Patient continues following with Dr. Shafer-- continues on lamictal, seroquel, and valium PRN.   Objective   Vital Signs:  /78   Pulse 78   Temp 97.5 °F (36.4 °C)   Ht 160 cm (62.99\")   Wt 73.8 kg (162 lb 12.8 oz)   SpO2 97%   BMI 28.85 kg/m²   Estimated body mass index is 28.85 kg/m² as calculated from the following:    Height as of this encounter: 160 cm (62.99\").    Weight as of this encounter: 73.8 kg (162 lb 12.8 oz).          Physical Exam  Vitals and nursing note reviewed.   Constitutional:       Appearance: Normal appearance.   HENT:      Head: Normocephalic and atraumatic.   Eyes:      Conjunctiva/sclera: Conjunctivae normal.      Pupils: Pupils are equal, round, and reactive to light.   Cardiovascular:      Rate and Rhythm: Normal rate and regular rhythm.      Pulses: Normal pulses.      Heart sounds: Normal heart sounds. No murmur heard.    No friction rub. No gallop.   Pulmonary:      Effort: Pulmonary effort is normal. No respiratory distress.      Breath sounds: Normal breath sounds. No stridor. No wheezing, rhonchi or rales.   Musculoskeletal:         General: Normal range of motion.      Cervical back: Normal range of motion and neck supple.   Skin:     General: Skin is warm and dry.      Findings: Lesion present.          Neurological:      General: No focal deficit present. "      Mental Status: She is alert and oriented to person, place, and time. Mental status is at baseline.      Motor: No weakness.   Psychiatric:         Mood and Affect: Mood normal.         Thought Content: Thought content normal.         Judgment: Judgment normal.        Result Review :  The following data was reviewed by: SRINIVAS Jacobs on 09/02/2022:  Common labs    Common Labsle 10/8/21 10/8/21 10/8/21 2/14/22    0937 0937 0937    Glucose  91     BUN  9     Creatinine  1.03 (A)     eGFR Non  Am  58 (A)     eGFR African Am  71     Sodium  139     Potassium  4.8     Chloride  100     Calcium  10.0     Total Protein  7.7     Albumin  4.80     Total Bilirubin  0.4     Alkaline Phosphatase  69     AST (SGOT)  13     ALT (SGPT)  11     WBC 5.63      Hemoglobin 14.3      Hematocrit 44.8      Platelets 388      Total Cholesterol   168 193   Triglycerides   58 52   HDL Cholesterol   51 62   LDL Cholesterol    106 (A) 121 (A)   (A) Abnormal value       Comments are available for some flowsheets but are not being displayed.                     Assessment and Plan   Diagnoses and all orders for this visit:    1. DDD (degenerative disc disease), lumbar (Primary)  Assessment & Plan:  Continues with PT/exercise.   Following with Dr. Cerda.       2. Skin lesion  Assessment & Plan:  Referral placed to dermatology.     Orders:  -     Ambulatory Referral to Dermatology    3. Bipolar disorder, in full remission, most recent episode mixed (HCC)  Assessment & Plan:  Continues on lamictal, seroquel, valium PRN.       4. Routine adult health maintenance  -     Cologuard - Stool, Per Rectum; Future    5. Abnormality of left breast on screening mammogram  -     Mammo Diagnostic Left With CAD; Future  -     US Breast Left Complete; Future           Follow Up   Return in about 6 months (around 3/2/2023) for Annual physical.  Patient was given instructions and counseling regarding her condition or for health maintenance  advice. Please see specific information pulled into the AVS if appropriate.

## 2022-09-18 DIAGNOSIS — M54.50 LUMBOSACRAL PAIN: ICD-10-CM

## 2022-09-19 RX ORDER — CYCLOBENZAPRINE HCL 10 MG
TABLET ORAL
Qty: 30 TABLET | Refills: 2 | Status: SHIPPED | OUTPATIENT
Start: 2022-09-19

## 2022-10-07 ENCOUNTER — HOSPITAL ENCOUNTER (OUTPATIENT)
Dept: ULTRASOUND IMAGING | Facility: HOSPITAL | Age: 47
End: 2022-10-07

## 2022-10-07 ENCOUNTER — HOSPITAL ENCOUNTER (OUTPATIENT)
Dept: MAMMOGRAPHY | Facility: HOSPITAL | Age: 47
End: 2022-10-07

## 2022-11-13 DIAGNOSIS — N63.20 MASS OF LEFT BREAST, UNSPECIFIED QUADRANT: Primary | ICD-10-CM

## 2022-12-06 NOTE — PROGRESS NOTES
"Subjective   Patient ID: Romana Beavers is a 47 y.o. female is here today for follow-up. Mr. Beavers was last seen on 08-10-22 with complaints of back pain that radiates into the left hip.    Today, Ms. Raphael reports back stiffness. She is really having back pain. She denies leg pain.     Is very nice lady has had chronic low back pain that worsened over the last year.  She does have structural disease with some disc degeneration, bulging and facet disease at L4-L5.  Its mainly in the left side of her back.  She still does not have sciatica.  She is working with a therapist, a , and a pelvic floor therapist.  She is feeling better.  She has a job where there is a lot of sitting.  She has no motor deficits.  I did bring up again the possibility of considering a radiofrequency ablation if the left-sided low back worsens.  If she develops sciatica, she should probably be reimaged.  But since she is doing well we can keep it open-ended.        History of Present Illness    The following portions of the patient's history were reviewed and updated as appropriate: allergies, current medications, past family history, past medical history, past social history, past surgical history and problem list.    Review of Systems   Constitutional: Negative for fever.   Respiratory: Negative for chest tightness and shortness of breath.    Cardiovascular: Negative for chest pain.   All other systems reviewed and are negative.          Objective     Vitals:    12/12/22 0933   BP: 120/68   Pulse: 80   SpO2: 98%   Weight: 73.5 kg (162 lb)   Height: 160 cm (62.99\")     Body mass index is 28.71 kg/m².    Tobacco Use: Low Risk    • Smoking Tobacco Use: Never   • Smokeless Tobacco Use: Never   • Passive Exposure: Not on file          Physical Exam  Constitutional:       Appearance: She is well-developed.   HENT:      Head: Normocephalic and atraumatic.   Eyes:      Extraocular Movements: EOM normal.      Conjunctiva/sclera: " Conjunctivae normal.      Pupils: Pupils are equal, round, and reactive to light.   Neck:      Vascular: No carotid bruit.   Neurological:      Mental Status: She is oriented to person, place, and time.      Coordination: Finger-Nose-Finger Test and Heel to Shin Test normal.      Gait: Gait is intact.      Deep Tendon Reflexes:      Reflex Scores:       Tricep reflexes are 2+ on the right side and 2+ on the left side.       Bicep reflexes are 2+ on the right side and 2+ on the left side.       Brachioradialis reflexes are 2+ on the right side and 2+ on the left side.       Patellar reflexes are 2+ on the right side and 2+ on the left side.       Achilles reflexes are 2+ on the right side and 2+ on the left side.  Psychiatric:         Speech: Speech normal.       Neurologic Exam     Mental Status   Oriented to person, place, and time.   Registration of memory: Good recent and remote memory.   Attention: normal. Concentration: normal.   Speech: speech is normal   Level of consciousness: alert  Knowledge: consistent with education.     Cranial Nerves     CN II   Visual fields full to confrontation.   Visual acuity: normal    CN III, IV, VI   Pupils are equal, round, and reactive to light.  Extraocular motions are normal.     CN V   Facial sensation intact.   Right corneal reflex: normal  Left corneal reflex: normal    CN VII   Facial expression full, symmetric.   Right facial weakness: none  Left facial weakness: none    CN VIII   Hearing: intact    CN IX, X   Palate: symmetric    CN XI   Right sternocleidomastoid strength: normal  Left sternocleidomastoid strength: normal    CN XII   Tongue: not atrophic  Tongue deviation: none    Motor Exam   Muscle bulk: normal  Right arm tone: normal  Left arm tone: normal  Right leg tone: normal  Left leg tone: normal    Strength   Strength 5/5 except as noted.     Sensory Exam   Light touch normal.     Gait, Coordination, and Reflexes     Gait  Gait: normal    Coordination    Finger to nose coordination: normal  Heel to shin coordination: normal    Reflexes   Right brachioradialis: 2+  Left brachioradialis: 2+  Right biceps: 2+  Left biceps: 2+  Right triceps: 2+  Left triceps: 2+  Right patellar: 2+  Left patellar: 2+  Right achilles: 2+  Left achilles: 2+  Right : 2+  Left : 2+          Assessment & Plan   Independent Review of Radiographic Studies:      I personally reviewed the images from the following studies.    The lumbar MRI done in February 2022 shows some disc degeneration at L4-L5 with desiccation and little bit of disc bulging towards the left and facet disease at L4-L5 and L5-S1.  Agree with the report.    Medical Decision Making:      At this point, we can keep it open-ended.  She is doing well and will continue her home exercises.  If there is a worsening of the back pain or if she develops sciatica, she will let me know.      Diagnoses and all orders for this visit:    1. DDD (degenerative disc disease), lumbar (Primary)    2. Herniated lumbar intervertebral disc    3. Facet arthritis of lumbar region      Return if symptoms worsen or fail to improve.

## 2022-12-12 ENCOUNTER — OFFICE VISIT (OUTPATIENT)
Dept: NEUROSURGERY | Facility: CLINIC | Age: 47
End: 2022-12-12

## 2022-12-12 VITALS
BODY MASS INDEX: 28.7 KG/M2 | WEIGHT: 162 LBS | HEART RATE: 80 BPM | OXYGEN SATURATION: 98 % | HEIGHT: 63 IN | DIASTOLIC BLOOD PRESSURE: 68 MMHG | SYSTOLIC BLOOD PRESSURE: 120 MMHG

## 2022-12-12 DIAGNOSIS — M51.36 DDD (DEGENERATIVE DISC DISEASE), LUMBAR: Primary | ICD-10-CM

## 2022-12-12 DIAGNOSIS — M51.26 HERNIATED LUMBAR INTERVERTEBRAL DISC: ICD-10-CM

## 2022-12-12 DIAGNOSIS — M47.816 FACET ARTHRITIS OF LUMBAR REGION: ICD-10-CM

## 2022-12-12 PROCEDURE — 99213 OFFICE O/P EST LOW 20 MIN: CPT | Performed by: NEUROLOGICAL SURGERY

## 2023-01-20 ENCOUNTER — HOSPITAL ENCOUNTER (OUTPATIENT)
Dept: MAMMOGRAPHY | Facility: HOSPITAL | Age: 48
Discharge: HOME | End: 2023-01-20
Payer: COMMERCIAL

## 2023-01-20 ENCOUNTER — HOSPITAL ENCOUNTER (OUTPATIENT)
Dept: ULTRASOUND IMAGING | Facility: HOSPITAL | Age: 48
Discharge: HOME | End: 2023-01-20
Payer: COMMERCIAL

## 2023-01-20 DIAGNOSIS — N63.20 MASS OF LEFT BREAST, UNSPECIFIED QUADRANT: ICD-10-CM

## 2023-01-20 PROCEDURE — G0279 TOMOSYNTHESIS, MAMMO: HCPCS

## 2023-01-20 PROCEDURE — 76642 ULTRASOUND BREAST LIMITED: CPT

## 2023-01-20 PROCEDURE — 77066 DX MAMMO INCL CAD BI: CPT

## 2024-06-13 ENCOUNTER — OFFICE VISIT (OUTPATIENT)
Dept: INTERNAL MEDICINE | Facility: CLINIC | Age: 49
End: 2024-06-13
Payer: COMMERCIAL

## 2024-06-13 VITALS
SYSTOLIC BLOOD PRESSURE: 118 MMHG | DIASTOLIC BLOOD PRESSURE: 78 MMHG | OXYGEN SATURATION: 98 % | RESPIRATION RATE: 18 BRPM | HEART RATE: 68 BPM | TEMPERATURE: 97.6 F | WEIGHT: 157 LBS | BODY MASS INDEX: 27.81 KG/M2

## 2024-06-13 DIAGNOSIS — M54.50 LUMBOSACRAL PAIN: Chronic | ICD-10-CM

## 2024-06-13 DIAGNOSIS — Z00.00 ANNUAL PHYSICAL EXAM: Primary | ICD-10-CM

## 2024-06-13 DIAGNOSIS — E66.3 OVERWEIGHT WITH BODY MASS INDEX (BMI) OF 27 TO 27.9 IN ADULT: ICD-10-CM

## 2024-06-13 DIAGNOSIS — F31.78 BIPOLAR DISORDER, IN FULL REMISSION, MOST RECENT EPISODE MIXED: Chronic | ICD-10-CM

## 2024-06-13 DIAGNOSIS — Z12.31 SCREENING MAMMOGRAM FOR BREAST CANCER: ICD-10-CM

## 2024-06-13 DIAGNOSIS — E78.2 MIXED HYPERLIPIDEMIA: Chronic | ICD-10-CM

## 2024-06-13 PROCEDURE — 99396 PREV VISIT EST AGE 40-64: CPT | Performed by: NURSE PRACTITIONER

## 2024-06-13 RX ORDER — ERGOCALCIFEROL 1.25 MG/1
50000 CAPSULE ORAL WEEKLY
COMMUNITY
End: 2024-06-13

## 2024-06-13 RX ORDER — CYCLOBENZAPRINE HCL 10 MG
10 TABLET ORAL 3 TIMES DAILY PRN
Qty: 30 TABLET | Refills: 2 | Status: SHIPPED | OUTPATIENT
Start: 2024-06-13

## 2024-06-13 NOTE — PROGRESS NOTES
"Chief Complaint  Annual Exam    Subjective        Romana Beavers presents to [facility]  History of Present Illness  This is a 48 y/o female presenting to office for CPE.     Reports following healthy diet choices; continues with routine exercise.     Denies tobacco use. Reports occasional alcohol use.     Continues following with Dr. Shafer for psychiatry needs r/t bipolar d/o; continues on lamictal; using valium QHS PRN.     Mammogram overdue  Colonoscopy-- Cologuard UTD 2023  Pap smear UTD 2021    Due for dental exam.     Had Mohs procedure this year under [facility].     Objective   Vital Signs:  /78 (BP Location: Right arm, Patient Position: Sitting, Cuff Size: Adult)   Pulse 68   Temp 97.6 °F (36.4 °C)   Resp 18   Wt 71.2 kg (157 lb)   SpO2 98%   BMI 27.81 kg/m²   Estimated body mass index is 27.81 kg/m² as calculated from the following:    Height as of 7/1/23: 160 cm (63\").    Weight as of this encounter: 71.2 kg (157 lb).       BMI is >= 25 and <30. (Overweight) The following options were offered after discussion;: exercise counseling/recommendations and nutrition counseling/recommendations      Physical Exam  Constitutional:       General: She is awake.      Appearance: Normal appearance.   HENT:      Head: Normocephalic and atraumatic.      Right Ear: Hearing, tympanic membrane, ear canal and external ear normal.      Left Ear: Hearing, tympanic membrane, ear canal and external ear normal.      Nose: Nose normal.      Mouth/Throat:      Lips: Pink.      Mouth: Mucous membranes are moist.      Pharynx: Oropharynx is clear.   Eyes:      Extraocular Movements: Extraocular movements intact.      Pupils: Pupils are equal, round, and reactive to light.   Neck:      Vascular: No carotid bruit.   Cardiovascular:      Rate and Rhythm: Normal rate and regular rhythm.      Pulses: Normal pulses.      Heart sounds: Normal heart sounds. No murmur heard.     No " gallop.   Pulmonary:      Effort: Pulmonary effort is normal. No respiratory distress.      Breath sounds: Normal breath sounds. No stridor. No wheezing, rhonchi or rales.   Abdominal:      General: Abdomen is flat. Bowel sounds are normal. There is no distension.      Palpations: Abdomen is soft.      Tenderness: There is no abdominal tenderness.   Musculoskeletal:         General: No swelling. Normal range of motion.      Cervical back: Normal range of motion and neck supple.   Skin:     General: Skin is warm and dry.      Capillary Refill: Capillary refill takes less than 2 seconds.      Coloration: Skin is not jaundiced.   Neurological:      General: No focal deficit present.      Mental Status: She is alert and oriented to person, place, and time. Mental status is at baseline.      Motor: Motor function is intact.      Coordination: Coordination is intact.      Gait: Gait is intact.      Deep Tendon Reflexes: Reflexes are normal and symmetric.   Psychiatric:         Attention and Perception: Attention normal.         Mood and Affect: Mood normal.         Speech: Speech normal.         Behavior: Behavior normal. Behavior is cooperative.         Thought Content: Thought content normal.         Cognition and Memory: Cognition normal.         Judgment: Judgment normal.        Result Review :    The following data was reviewed by: SRINIVAS Jacobs on 06/13/2024:    Tobacco Use: Low Risk  (6/13/2024)    Patient History     Smoking Tobacco Use: Never     Smokeless Tobacco Use: Never     Passive Exposure: Not on file     Social History     Substance and Sexual Activity   Alcohol Use Yes     History reviewed. No pertinent family history.               Assessment and Plan     Diagnoses and all orders for this visit:    1. Annual physical exam (Primary)  Assessment & Plan:  Recommended 150-300 min weekly exercise  Continue with healthy diet choices  Labs ordered  Mammogram ordered  Pap smear due in nov 2024  Dotty  UTD 2023  Anticipatory guidance given regarding health prevention/wellness, diet/exercise, tobacco/alcohol/drug education, exercise and wellbeing, covid 19 guidance, vaccination recommendations, and sexual health/STD education.   Recommended bi-yearly dental exams and regular vision examinations.       Orders:  -     CBC & Differential  -     Comprehensive metabolic panel  -     TSH Rfx On Abnormal To Free T4  -     Hemoglobin A1c    2. Mixed hyperlipidemia  -     Lipid panel    3. Lumbosacral pain  Assessment & Plan:  Stable  Using flexeril PRN    Orders:  -     cyclobenzaprine (FLEXERIL) 10 MG tablet; Take 1 tablet by mouth 3 (Three) Times a Day As Needed for Muscle Spasms.  Dispense: 30 tablet; Refill: 2    4. Bipolar disorder, in full remission, most recent episode mixed  Assessment & Plan:  Continues following with Dr. Soni Garcia SI  Cont on lamictal  Using valium PRN      5. Overweight with body mass index (BMI) of 27 to 27.9 in adult  Assessment & Plan:  BMI is >= 25 and <30. (Overweight) The following options were offered after discussion;: exercise counseling/recommendations and nutrition counseling/recommendations      6. Screening mammogram for breast cancer  -     Mammo Screening Digital Tomosynthesis Bilateral With CAD; Future             Follow Up     Return in about 5 months (around 11/13/2024) for Pap smear.  Patient was given instructions and counseling regarding her condition or for health maintenance advice. Please see specific information pulled into the AVS if appropriate.

## 2024-06-13 NOTE — ASSESSMENT & PLAN NOTE
Recommended 150-300 min weekly exercise  Continue with healthy diet choices  Labs ordered  Mammogram ordered  Pap smear due in nov 2024  Cologuard UTD 2023  Anticipatory guidance given regarding health prevention/wellness, diet/exercise, tobacco/alcohol/drug education, exercise and wellbeing, covid 19 guidance, vaccination recommendations, and sexual health/STD education.   Recommended bi-yearly dental exams and regular vision examinations.

## 2024-06-14 LAB
ALBUMIN SERPL-MCNC: 4.6 G/DL (ref 3.5–5.2)
ALBUMIN/GLOB SERPL: 1.6 G/DL
ALP SERPL-CCNC: 66 U/L (ref 39–117)
ALT SERPL-CCNC: 17 U/L (ref 1–33)
AST SERPL-CCNC: 25 U/L (ref 1–32)
BASOPHILS # BLD AUTO: 0.09 10*3/MM3 (ref 0–0.2)
BASOPHILS NFR BLD AUTO: 1.3 % (ref 0–1.5)
BILIRUB SERPL-MCNC: <0.2 MG/DL (ref 0–1.2)
BUN SERPL-MCNC: 13 MG/DL (ref 6–20)
BUN/CREAT SERPL: 13.3 (ref 7–25)
CALCIUM SERPL-MCNC: 9.7 MG/DL (ref 8.6–10.5)
CHLORIDE SERPL-SCNC: 102 MMOL/L (ref 98–107)
CHOLEST SERPL-MCNC: 179 MG/DL (ref 0–200)
CO2 SERPL-SCNC: 27.8 MMOL/L (ref 22–29)
CREAT SERPL-MCNC: 0.98 MG/DL (ref 0.57–1)
EGFRCR SERPLBLD CKD-EPI 2021: 71.8 ML/MIN/1.73
EOSINOPHIL # BLD AUTO: 0.1 10*3/MM3 (ref 0–0.4)
EOSINOPHIL NFR BLD AUTO: 1.5 % (ref 0.3–6.2)
ERYTHROCYTE [DISTWIDTH] IN BLOOD BY AUTOMATED COUNT: 12.9 % (ref 12.3–15.4)
GLOBULIN SER CALC-MCNC: 2.8 GM/DL
GLUCOSE SERPL-MCNC: 77 MG/DL (ref 65–99)
HBA1C MFR BLD: 5 % (ref 4.8–5.6)
HCT VFR BLD AUTO: 40 % (ref 34–46.6)
HDLC SERPL-MCNC: 64 MG/DL (ref 40–60)
HGB BLD-MCNC: 13.4 G/DL (ref 12–15.9)
IMM GRANULOCYTES # BLD AUTO: 0.01 10*3/MM3 (ref 0–0.05)
IMM GRANULOCYTES NFR BLD AUTO: 0.1 % (ref 0–0.5)
LDLC SERPL CALC-MCNC: 102 MG/DL (ref 0–100)
LYMPHOCYTES # BLD AUTO: 2.36 10*3/MM3 (ref 0.7–3.1)
LYMPHOCYTES NFR BLD AUTO: 34.6 % (ref 19.6–45.3)
MCH RBC QN AUTO: 30.8 PG (ref 26.6–33)
MCHC RBC AUTO-ENTMCNC: 33.5 G/DL (ref 31.5–35.7)
MCV RBC AUTO: 92 FL (ref 79–97)
MONOCYTES # BLD AUTO: 0.53 10*3/MM3 (ref 0.1–0.9)
MONOCYTES NFR BLD AUTO: 7.8 % (ref 5–12)
NEUTROPHILS # BLD AUTO: 3.74 10*3/MM3 (ref 1.7–7)
NEUTROPHILS NFR BLD AUTO: 54.7 % (ref 42.7–76)
NRBC BLD AUTO-RTO: 0 /100 WBC (ref 0–0.2)
PLATELET # BLD AUTO: 338 10*3/MM3 (ref 140–450)
POTASSIUM SERPL-SCNC: 4.6 MMOL/L (ref 3.5–5.2)
PROT SERPL-MCNC: 7.4 G/DL (ref 6–8.5)
RBC # BLD AUTO: 4.35 10*6/MM3 (ref 3.77–5.28)
SODIUM SERPL-SCNC: 137 MMOL/L (ref 136–145)
TRIGL SERPL-MCNC: 69 MG/DL (ref 0–150)
TSH SERPL DL<=0.005 MIU/L-ACNC: 1.11 UIU/ML (ref 0.27–4.2)
VLDLC SERPL CALC-MCNC: 13 MG/DL (ref 5–40)
WBC # BLD AUTO: 6.83 10*3/MM3 (ref 3.4–10.8)

## 2024-07-22 ENCOUNTER — HOSPITAL ENCOUNTER (OUTPATIENT)
Dept: MAMMOGRAPHY | Facility: HOSPITAL | Age: 49
Discharge: HOME | End: 2024-07-22
Admitting: NURSE PRACTITIONER
Payer: COMMERCIAL

## 2024-07-22 DIAGNOSIS — Z12.31 SCREENING MAMMOGRAM FOR BREAST CANCER: ICD-10-CM

## 2024-07-22 PROCEDURE — 77067 SCR MAMMO BI INCL CAD: CPT

## 2024-07-22 PROCEDURE — 77063 BREAST TOMOSYNTHESIS BI: CPT

## 2024-12-13 ENCOUNTER — OFFICE VISIT (OUTPATIENT)
Dept: INTERNAL MEDICINE | Facility: CLINIC | Age: 49
End: 2024-12-13
Payer: COMMERCIAL

## 2024-12-13 VITALS
HEART RATE: 94 BPM | WEIGHT: 157 LBS | DIASTOLIC BLOOD PRESSURE: 80 MMHG | HEIGHT: 63 IN | BODY MASS INDEX: 27.82 KG/M2 | SYSTOLIC BLOOD PRESSURE: 130 MMHG | OXYGEN SATURATION: 99 %

## 2024-12-13 DIAGNOSIS — Z12.4 ENCOUNTER FOR PAP SMEAR OF CERVIX WITH HPV DNA COTESTING: Primary | ICD-10-CM

## 2024-12-13 PROCEDURE — 99396 PREV VISIT EST AGE 40-64: CPT | Performed by: NURSE PRACTITIONER

## 2024-12-13 NOTE — PROGRESS NOTES
"Chief Complaint  Pap Smear     Subjective        Romana Beavers presents to [facility]  History of Present Illness  This is a 50 y/o female presenting to office for pap smear encounter.   UTD mammogram 7/22/24  Cologuard UTD 2023  Reports last menstrual cycle back in July 2024   Reports she is still sexually active; denies any issues   Reports occasional hot flushing; continues on valium per psych at night for hx of anxiety  Denies any abnormal AUB, discharge, or vaginal concerns    Objective   Vital Signs:  /80 (BP Location: Left arm, Patient Position: Sitting, Cuff Size: Adult)   Pulse 94   Ht 160 cm (63\")   Wt 71.2 kg (157 lb)   SpO2 99%   BMI 27.81 kg/m²   Estimated body mass index is 27.81 kg/m² as calculated from the following:    Height as of this encounter: 160 cm (63\").    Weight as of this encounter: 71.2 kg (157 lb).            Physical Exam  Constitutional:       Appearance: Normal appearance.   HENT:      Head: Normocephalic and atraumatic.      Right Ear: External ear normal.      Left Ear: External ear normal.      Nose: Nose normal.      Mouth/Throat:      Mouth: Mucous membranes are moist.   Eyes:      Pupils: Pupils are equal, round, and reactive to light.   Pulmonary:      Effort: Pulmonary effort is normal.   Genitourinary:     Labia:         Right: No rash, tenderness or lesion.         Left: No rash, tenderness or lesion.       Vagina: No vaginal discharge or tenderness.      Cervix: Normal. No erythema.      Uterus: Normal.       Adnexa: Right adnexa normal and left adnexa normal.        Right: No mass.          Left: No mass.        Rectum: External hemorrhoid present.      Comments: Noted prominent pelvic bone with insertion of speculum   Neurological:      Mental Status: She is alert.        Result Review :  The following data was reviewed by: SRINIVAS Jacobs on 12/13/2024:  Common labs          6/13/2024    16:23   Common Labs   Glucose 77  "   BUN 13    Creatinine 0.98    Sodium 137    Potassium 4.6    Chloride 102    Calcium 9.7    Total Protein 7.4    Albumin 4.6    Total Bilirubin <0.2    Alkaline Phosphatase 66    AST (SGOT) 25    ALT (SGPT) 17    WBC 6.83    Hemoglobin 13.4    Hematocrit 40.0    Platelets 338    Total Cholesterol 179    Triglycerides 69    HDL Cholesterol 64    LDL Cholesterol  102    Hemoglobin A1C 5.00      Tobacco Use: Low Risk  (12/13/2024)    Patient History     Smoking Tobacco Use: Never     Smokeless Tobacco Use: Never     Passive Exposure: Not on file     Social History     Substance and Sexual Activity   Alcohol Use Yes    Comment: 0-3 drinks a week     Family History   Problem Relation Age of Onset    Hearing loss Mother     Basal cell carcinoma Mother     Hearing loss Maternal Grandfather     Glaucoma Maternal Grandfather     Basal cell carcinoma Maternal Grandfather                Assessment and Plan   Diagnoses and all orders for this visit:    1. Encounter for Pap smear of cervix with HPV DNA cotesting (Primary)  -     IGP, Aptima HPV, Rfx 16 / 18,45    Pap smear completed  Discussed HRT indications, risks vs benefits-- she will let us know if she decides she wants to proceed  Hand out provided; also recommend [place] [employer] Menopausal Society Guideline website for education   Continue with yearly mammograms  Cologuard UTD   Rto in 6 months for CPE         Follow Up   Return in about 6 months (around 6/13/2025) for Annual physical.  Patient was given instructions and counseling regarding her condition or for health maintenance advice. Please see specific information pulled into the AVS if appropriate.

## 2024-12-19 LAB
CYTOLOGIST CVX/VAG CYTO: NORMAL
CYTOLOGY CVX/VAG DOC CYTO: NORMAL
CYTOLOGY CVX/VAG DOC THIN PREP: NORMAL
DX ICD CODE: NORMAL
HPV GENOTYPE REFLEX: NORMAL
HPV I/H RISK 4 DNA CVX QL PROBE+SIG AMP: NEGATIVE
Lab: NORMAL
OTHER STN SPEC: NORMAL
STAT OF ADQ CVX/VAG CYTO-IMP: NORMAL

## 2025-02-04 DIAGNOSIS — M54.50 LUMBOSACRAL PAIN: Chronic | ICD-10-CM

## 2025-02-05 RX ORDER — CYCLOBENZAPRINE HCL 10 MG
10 TABLET ORAL 3 TIMES DAILY PRN
Qty: 30 TABLET | Refills: 2 | Status: SHIPPED | OUTPATIENT
Start: 2025-02-05